# Patient Record
Sex: MALE | Race: WHITE | NOT HISPANIC OR LATINO | Employment: FULL TIME | URBAN - METROPOLITAN AREA
[De-identification: names, ages, dates, MRNs, and addresses within clinical notes are randomized per-mention and may not be internally consistent; named-entity substitution may affect disease eponyms.]

---

## 2019-01-17 ENCOUNTER — HOSPITAL ENCOUNTER (EMERGENCY)
Facility: CLINIC | Age: 44
Discharge: HOME | End: 2019-01-17
Attending: EMERGENCY MEDICINE | Admitting: EMERGENCY MEDICINE
Payer: COMMERCIAL

## 2019-01-17 ENCOUNTER — APPOINTMENT (OUTPATIENT)
Dept: CT IMAGING | Facility: CLINIC | Age: 44
End: 2019-01-17
Payer: COMMERCIAL

## 2019-01-17 ENCOUNTER — APPOINTMENT (OUTPATIENT)
Dept: ULTRASOUND IMAGING | Facility: CLINIC | Age: 44
End: 2019-01-17
Payer: COMMERCIAL

## 2019-01-17 VITALS
TEMPERATURE: 98.7 F | SYSTOLIC BLOOD PRESSURE: 135 MMHG | DIASTOLIC BLOOD PRESSURE: 81 MMHG | HEIGHT: 71 IN | BODY MASS INDEX: 25.48 KG/M2 | WEIGHT: 182 LBS | OXYGEN SATURATION: 96 % | RESPIRATION RATE: 22 BRPM | HEART RATE: 97 BPM

## 2019-01-17 DIAGNOSIS — R07.89 OTHER CHEST PAIN: ICD-10-CM

## 2019-01-17 DIAGNOSIS — K80.20 CALCULUS OF GALLBLADDER WITHOUT CHOLECYSTITIS WITHOUT OBSTRUCTION: ICD-10-CM

## 2019-01-17 LAB
ALBUMIN SERPL-MCNC: 4.8 G/DL (ref 3.4–5)
ALP SERPL-CCNC: 95 U/L (ref 40–150)
ALT SERPL W P-5'-P-CCNC: 32 U/L (ref 0–70)
ANION GAP SERPL CALCULATED.3IONS-SCNC: 9 MMOL/L (ref 3–14)
AST SERPL W P-5'-P-CCNC: 28 U/L (ref 0–45)
BASOPHILS # BLD AUTO: 0.1 10E9/L (ref 0–0.2)
BASOPHILS NFR BLD AUTO: 0.5 %
BILIRUB SERPL-MCNC: 0.6 MG/DL (ref 0.2–1.3)
BUN SERPL-MCNC: 20 MG/DL (ref 7–30)
CALCIUM SERPL-MCNC: 9.5 MG/DL (ref 8.5–10.1)
CHLORIDE SERPL-SCNC: 104 MMOL/L (ref 94–109)
CO2 SERPL-SCNC: 25 MMOL/L (ref 20–32)
CREAT SERPL-MCNC: 0.88 MG/DL (ref 0.66–1.25)
D DIMER PPP FEU-MCNC: 0.7 UG/ML FEU (ref 0–0.5)
DIFFERENTIAL METHOD BLD: NORMAL
EOSINOPHIL # BLD AUTO: 0.2 10E9/L (ref 0–0.7)
EOSINOPHIL NFR BLD AUTO: 1.5 %
ERYTHROCYTE [DISTWIDTH] IN BLOOD BY AUTOMATED COUNT: 12.1 % (ref 10–15)
GFR SERPL CREATININE-BSD FRML MDRD: >90 ML/MIN/{1.73_M2}
GLUCOSE SERPL-MCNC: 113 MG/DL (ref 70–99)
HCT VFR BLD AUTO: 51 % (ref 40–53)
HGB BLD-MCNC: 17.3 G/DL (ref 13.3–17.7)
IMM GRANULOCYTES # BLD: 0 10E9/L (ref 0–0.4)
IMM GRANULOCYTES NFR BLD: 0.3 %
INR PPP: 0.94 (ref 0.86–1.14)
INTERPRETATION ECG - MUSE: NORMAL
LIPASE SERPL-CCNC: 167 U/L (ref 73–393)
LYMPHOCYTES # BLD AUTO: 3.1 10E9/L (ref 0.8–5.3)
LYMPHOCYTES NFR BLD AUTO: 29.8 %
MCH RBC QN AUTO: 29.8 PG (ref 26.5–33)
MCHC RBC AUTO-ENTMCNC: 33.9 G/DL (ref 31.5–36.5)
MCV RBC AUTO: 88 FL (ref 78–100)
MONOCYTES # BLD AUTO: 0.8 10E9/L (ref 0–1.3)
MONOCYTES NFR BLD AUTO: 7.8 %
NEUTROPHILS # BLD AUTO: 6.3 10E9/L (ref 1.6–8.3)
NEUTROPHILS NFR BLD AUTO: 60.1 %
NRBC # BLD AUTO: 0 10*3/UL
NRBC BLD AUTO-RTO: 0 /100
PLATELET # BLD AUTO: 284 10E9/L (ref 150–450)
POTASSIUM SERPL-SCNC: 3.2 MMOL/L (ref 3.4–5.3)
PROT SERPL-MCNC: 8.5 G/DL (ref 6.8–8.8)
RBC # BLD AUTO: 5.8 10E12/L (ref 4.4–5.9)
SODIUM SERPL-SCNC: 138 MMOL/L (ref 133–144)
TROPONIN I SERPL-MCNC: <0.015 UG/L (ref 0–0.04)
WBC # BLD AUTO: 10.4 10E9/L (ref 4–11)

## 2019-01-17 PROCEDURE — 25000128 H RX IP 250 OP 636: Performed by: EMERGENCY MEDICINE

## 2019-01-17 PROCEDURE — 84484 ASSAY OF TROPONIN QUANT: CPT | Performed by: EMERGENCY MEDICINE

## 2019-01-17 PROCEDURE — 96374 THER/PROPH/DIAG INJ IV PUSH: CPT | Mod: 59

## 2019-01-17 PROCEDURE — 93005 ELECTROCARDIOGRAM TRACING: CPT

## 2019-01-17 PROCEDURE — 25000132 ZZH RX MED GY IP 250 OP 250 PS 637: Performed by: EMERGENCY MEDICINE

## 2019-01-17 PROCEDURE — 85379 FIBRIN DEGRADATION QUANT: CPT | Performed by: EMERGENCY MEDICINE

## 2019-01-17 PROCEDURE — 96375 TX/PRO/DX INJ NEW DRUG ADDON: CPT | Mod: 59

## 2019-01-17 PROCEDURE — 96361 HYDRATE IV INFUSION ADD-ON: CPT

## 2019-01-17 PROCEDURE — 85610 PROTHROMBIN TIME: CPT | Performed by: EMERGENCY MEDICINE

## 2019-01-17 PROCEDURE — 99285 EMERGENCY DEPT VISIT HI MDM: CPT | Mod: 25

## 2019-01-17 PROCEDURE — 85025 COMPLETE CBC W/AUTO DIFF WBC: CPT | Performed by: EMERGENCY MEDICINE

## 2019-01-17 PROCEDURE — 76705 ECHO EXAM OF ABDOMEN: CPT

## 2019-01-17 PROCEDURE — 80053 COMPREHEN METABOLIC PANEL: CPT | Performed by: EMERGENCY MEDICINE

## 2019-01-17 PROCEDURE — 71260 CT THORAX DX C+: CPT

## 2019-01-17 PROCEDURE — 83690 ASSAY OF LIPASE: CPT | Performed by: EMERGENCY MEDICINE

## 2019-01-17 RX ORDER — ONDANSETRON 2 MG/ML
4 INJECTION INTRAMUSCULAR; INTRAVENOUS
Status: COMPLETED | OUTPATIENT
Start: 2019-01-17 | End: 2019-01-17

## 2019-01-17 RX ORDER — HYDROMORPHONE HYDROCHLORIDE 1 MG/ML
0.5 INJECTION, SOLUTION INTRAMUSCULAR; INTRAVENOUS; SUBCUTANEOUS
Status: DISCONTINUED | OUTPATIENT
Start: 2019-01-17 | End: 2019-01-17 | Stop reason: HOSPADM

## 2019-01-17 RX ORDER — HYDROCODONE BITARTRATE AND ACETAMINOPHEN 5; 325 MG/1; MG/1
1 TABLET ORAL EVERY 6 HOURS PRN
Qty: 10 TABLET | Refills: 0 | Status: SHIPPED | OUTPATIENT
Start: 2019-01-17 | End: 2019-02-16

## 2019-01-17 RX ORDER — SODIUM CHLORIDE 9 MG/ML
1000 INJECTION, SOLUTION INTRAVENOUS CONTINUOUS
Status: DISCONTINUED | OUTPATIENT
Start: 2019-01-17 | End: 2019-01-17 | Stop reason: HOSPADM

## 2019-01-17 RX ORDER — KETOROLAC TROMETHAMINE 30 MG/ML
30 INJECTION, SOLUTION INTRAMUSCULAR; INTRAVENOUS ONCE
Status: COMPLETED | OUTPATIENT
Start: 2019-01-17 | End: 2019-01-17

## 2019-01-17 RX ORDER — IOPAMIDOL 755 MG/ML
500 INJECTION, SOLUTION INTRAVASCULAR ONCE
Status: COMPLETED | OUTPATIENT
Start: 2019-01-17 | End: 2019-01-17

## 2019-01-17 RX ORDER — ASPIRIN 81 MG/1
324 TABLET, CHEWABLE ORAL ONCE
Status: COMPLETED | OUTPATIENT
Start: 2019-01-17 | End: 2019-01-17

## 2019-01-17 RX ADMIN — Medication 0.5 MG: at 00:31

## 2019-01-17 RX ADMIN — IOPAMIDOL 69 ML: 755 INJECTION, SOLUTION INTRAVENOUS at 01:13

## 2019-01-17 RX ADMIN — SODIUM CHLORIDE 1000 ML: 9 INJECTION, SOLUTION INTRAVENOUS at 01:08

## 2019-01-17 RX ADMIN — ASPIRIN 81 MG 324 MG: 81 TABLET ORAL at 00:31

## 2019-01-17 RX ADMIN — ONDANSETRON 4 MG: 2 INJECTION INTRAMUSCULAR; INTRAVENOUS at 00:31

## 2019-01-17 RX ADMIN — KETOROLAC TROMETHAMINE 30 MG: 30 INJECTION, SOLUTION INTRAMUSCULAR at 01:08

## 2019-01-17 RX ADMIN — SODIUM CHLORIDE 83 ML: 9 INJECTION, SOLUTION INTRAVENOUS at 01:22

## 2019-01-17 ASSESSMENT — ENCOUNTER SYMPTOMS
VOMITING: 0
NAUSEA: 0
SHORTNESS OF BREATH: 1
DIZZINESS: 1
COUGH: 0
LIGHT-HEADEDNESS: 1

## 2019-01-17 ASSESSMENT — MIFFLIN-ST. JEOR: SCORE: 1752.68

## 2019-01-17 NOTE — ED PROVIDER NOTES
"  History     Chief Complaint:  Chest Pain    HPI   Rashad Traore is a 43 year old male, otherwise healthy, who presents to the emergency department for evaluation of low center chest pain. The patient reports he started experiencing sudden onset sharp shooting chest pain that radiates to his back when he lying down to go to bed about one hour prior to evaluation. He reports experiencing shortness of breath, lightheadedness and dizziness with the pain. He denies any nausea, vomiting, cough, or cold symptoms. He reports the pain does not necessarily worsen with deep breaths. Patient reports he last ate at about 1800 this evening and did have one drink, which is not unusual. He reports a recent flight home from [place] on 1/1/19. He denies any history of diabetes, GERD, drug use, smoking or recent trauma.    Allergies:  No known drug allergies     Medications:    The patient is not currently taking any prescribed medications.    Past Medical History:    The patient does not have any past pertinent medical history.    Past Surgical History:    History reviewed. No pertinent surgical history.    Family History:    History reviewed. No pertinent family history.     Social History:  Smoking status: Never  Alcohol use: Yes  The patient presents to the emergency department by himself  Marital Status:  Single [1]     Review of Systems   Respiratory: Positive for shortness of breath. Negative for cough.    Cardiovascular: Positive for chest pain.   Gastrointestinal: Negative for nausea and vomiting.   Neurological: Positive for dizziness and light-headedness.   All other systems reviewed and are negative.        Physical Exam   First Vitals:  BP: (!) 142/98  Pulse: 58  Temp: 98.7  F (37.1  C)  Resp: 22  Height: 180.3 cm (5' 11\")  Weight: 82.6 kg (182 lb)  SpO2: 98 %    Physical Exam  General: The patient is alert. Patient is tachypneic.    HENT: Mucous membranes moist.    Cardiovascular: Regular rate and rhythm. Good pulses " in all four extremities. Normal capillary refill and skin turgor.     Respiratory: Lungs are clear. No nasal flaring. No retractions. No wheezing, no crackles.    Gastrointestinal: Abdomen soft. No guarding, no rebound. No palpable hernias. No abdominal tenderness.    Musculoskeletal: No gross deformity.  Low chest wall tenderness. No crepitus.    Skin: No rashes or petechiae.     Neurologic: The patient is alert and oriented x3. GCS 15. No testable cranial nerve deficit. Follows commands with clear and appropriate speech. Gives appropriate answers. Good strength in all extremities. No gross neurologic deficit. Gross sensation intact. Pupils are round and reactive. No meningismus.     Lymphatic: No cervical adenopathy. No lower extremity swelling.    Psychiatric: The patient is non-tearful.    Emergency Department Course   ECG (0:19:39):  Rate 65 bpm. KY interval 134. QRS duration 82. QT/QTc 376/391. P-R-T axes 28 24 49. Sinus rhythm with marked sinus arrhythmia. Otherwise normal ECG. Interpreted at 0020 by Brian Toribio MD.    Imaging:  Radiographic findings were communicated with the patient who voiced understanding of the findings.    Chest CT, IV contrast only - PE protocol  IMPRESSION: There is no pulmonary embolus, aortic aneurysm or  dissection. No acute abnormality.   As read by Radiology.    Abdomen US, limited (RUQ only)  IMPRESSION: Cholelithiasis. There is no biliary dilatation or evidence  of cholecystitis.  As read by Radiology.    Laboratory:  CBC: AWNL (WBC 10.4, HGB 17.3, )  CMP: Potassium 3.2 (L), Glucose 113 (H) o/w WNL (Creatinine 0.88)    Troponin I (0023): <0.015  Lipase: 167  INR: 0.94  D dimer: 0.7 (H)    Interventions:  0031 Dilaudid 0.5 mg IV  0031 Aspirin 324 mg PO  0031 Zofran 4 mg IV  0108 NS 1L IV Bolus  0108 Toradol 30 mg IV    Emergency Department Course:  Past medical records, nursing notes, and vitals reviewed.  0019: I performed an exam of the patient and obtained  history, as documented above.     IV inserted and blood drawn.    The patient was sent for a chest CT while in the emergency department, findings above.     0100: I rechecked the patient. Patient reports pain is slightly duller, but still there. We will scan him.    0145: I rechecked the patient. He reports his pain is doing better.    0244: I rechecked the patient. Findings and plan explained to the Patient. Patient discharged home with instructions regarding supportive care, medications, and reasons to return. The importance of close follow-up was reviewed.   Impression & Plan    Medical Decision Making:  The patient had recently flown to [facility] and reports sudden onset of sharp pleuritic pain, worsening when he takes a deep breath. He is shortness of breath and he has no abdominal tenderness. At this point, I was highly suspicious that this could potentially be a PE, however due to his lack of classic symptoms, I did order a d dimer instead of a chest CT. The d dimer was positive and so I did perform a chest CT, which was negative besides some atelectasis. When this came back negative, I did feel that other causes were possible, such as intraabdominal causes and with the patient being so uncomfortable, yet moving, I did not feel that a peritoneal finding was the cause. I did consider stones, such as gallstones or kidney stones. The patient could not give us a urine sample but the ultrasound shows signs of cholelithiasis but no cholecystitis. No dilated duct, LFT's were normal, the patient was pain free. I did refer him to surgery as an outpatient and he was discharged home with a limited supply of pain medication and to return with any problems and is aware of potential complications.     Diagnosis:    ICD-10-CM   1. Other chest pain R07.89   2. Calculus of gallbladder without cholecystitis without obstruction K80.20     Disposition:  Discharged to home with instructions for follow up.  Discharge  Medications:  Started    HYDROcodone-acetaminophen 5-325 MG tablet  Commonly known as:  NORCO  1 tablet, Oral, EVERY 6 HOURS PRN       Alek Alcantar  1/17/2019   [facility] EMERGENCY DEPARTMENT  Scribe Disclosure:  I, Alek Alcantar, am serving as a scribe at 12:19 AM on 1/17/2019 to document services personally performed by Brian Toribio MD based on my observations and the provider's statements to me.      Brian Toribio MD  01/17/19 0458

## 2019-01-17 NOTE — ED AVS SNAPSHOT
[facility] Emergency Department  [address]  [facility] [address]  Phone:  [phone]  Fax:  [phone]                                    Rashad Traore   MRN: [id number]    Department:  [facility] Emergency Department   Date of Visit:  1/17/2019           After Visit Summary Signature Page    I have received my discharge instructions, and my questions have been answered. I have discussed any challenges I see with this plan with the nurse or doctor.    ..........................................................................................................................................  Patient/Patient Representative Signature      ..........................................................................................................................................  Patient Representative Print Name and Relationship to Patient    ..................................................               ................................................  Date                                   Time    ..........................................................................................................................................  Reviewed by Signature/Title    ...................................................              ..............................................  Date                                               Time          22EPIC Rev 08/18

## 2019-01-17 NOTE — ED TRIAGE NOTES
3039 sudden SOB/chest pain moving to back. Pt cant get full breath. Recent flight to [place].    Pt A&O x 3, CMS x 3, ABCD's adequate in triage

## 2020-08-15 ENCOUNTER — HOSPITAL ENCOUNTER (EMERGENCY)
Facility: CLINIC | Age: 45
Discharge: HOME | End: 2020-08-16
Attending: EMERGENCY MEDICINE | Admitting: EMERGENCY MEDICINE
Payer: COMMERCIAL

## 2020-08-15 DIAGNOSIS — K80.20 SYMPTOMATIC CHOLELITHIASIS: ICD-10-CM

## 2020-08-15 LAB
ALBUMIN SERPL-MCNC: 4.6 G/DL (ref 3.4–5)
ALP SERPL-CCNC: 95 U/L (ref 40–150)
ALT SERPL W P-5'-P-CCNC: 46 U/L (ref 0–70)
ANION GAP SERPL CALCULATED.3IONS-SCNC: 7 MMOL/L (ref 3–14)
AST SERPL W P-5'-P-CCNC: 26 U/L (ref 0–45)
BASOPHILS # BLD AUTO: 0.1 10E9/L (ref 0–0.2)
BASOPHILS NFR BLD AUTO: 0.4 %
BILIRUB SERPL-MCNC: 0.5 MG/DL (ref 0.2–1.3)
BUN SERPL-MCNC: 19 MG/DL (ref 7–30)
CALCIUM SERPL-MCNC: 9.1 MG/DL (ref 8.5–10.1)
CHLORIDE SERPL-SCNC: 105 MMOL/L (ref 94–109)
CO2 SERPL-SCNC: 26 MMOL/L (ref 20–32)
CREAT SERPL-MCNC: 0.96 MG/DL (ref 0.66–1.25)
DIFFERENTIAL METHOD BLD: ABNORMAL
EOSINOPHIL # BLD AUTO: 0.1 10E9/L (ref 0–0.7)
EOSINOPHIL NFR BLD AUTO: 0.8 %
ERYTHROCYTE [DISTWIDTH] IN BLOOD BY AUTOMATED COUNT: 12 % (ref 10–15)
GFR SERPL CREATININE-BSD FRML MDRD: >90 ML/MIN/{1.73_M2}
GLUCOSE SERPL-MCNC: 110 MG/DL (ref 70–99)
HCT VFR BLD AUTO: 46.1 % (ref 40–53)
HGB BLD-MCNC: 15.2 G/DL (ref 13.3–17.7)
IMM GRANULOCYTES # BLD: 0.1 10E9/L (ref 0–0.4)
IMM GRANULOCYTES NFR BLD: 0.5 %
LIPASE SERPL-CCNC: 133 U/L (ref 73–393)
LYMPHOCYTES # BLD AUTO: 1.9 10E9/L (ref 0.8–5.3)
LYMPHOCYTES NFR BLD AUTO: 11.6 %
MCH RBC QN AUTO: 29.7 PG (ref 26.5–33)
MCHC RBC AUTO-ENTMCNC: 33 G/DL (ref 31.5–36.5)
MCV RBC AUTO: 90 FL (ref 78–100)
MONOCYTES # BLD AUTO: 0.6 10E9/L (ref 0–1.3)
MONOCYTES NFR BLD AUTO: 3.7 %
NEUTROPHILS # BLD AUTO: 13.6 10E9/L (ref 1.6–8.3)
NEUTROPHILS NFR BLD AUTO: 83 %
NRBC # BLD AUTO: 0 10*3/UL
NRBC BLD AUTO-RTO: 0 /100
PLATELET # BLD AUTO: 273 10E9/L (ref 150–450)
POTASSIUM SERPL-SCNC: 3.4 MMOL/L (ref 3.4–5.3)
PROT SERPL-MCNC: 8 G/DL (ref 6.8–8.8)
RBC # BLD AUTO: 5.11 10E12/L (ref 4.4–5.9)
SODIUM SERPL-SCNC: 138 MMOL/L (ref 133–144)
TROPONIN I SERPL-MCNC: <0.015 UG/L (ref 0–0.04)
WBC # BLD AUTO: 16.4 10E9/L (ref 4–11)

## 2020-08-15 PROCEDURE — 99285 EMERGENCY DEPT VISIT HI MDM: CPT | Mod: 25

## 2020-08-15 PROCEDURE — 25000128 H RX IP 250 OP 636: Performed by: EMERGENCY MEDICINE

## 2020-08-15 PROCEDURE — 85025 COMPLETE CBC W/AUTO DIFF WBC: CPT | Performed by: EMERGENCY MEDICINE

## 2020-08-15 PROCEDURE — 96374 THER/PROPH/DIAG INJ IV PUSH: CPT

## 2020-08-15 PROCEDURE — 80053 COMPREHEN METABOLIC PANEL: CPT | Performed by: EMERGENCY MEDICINE

## 2020-08-15 PROCEDURE — 83690 ASSAY OF LIPASE: CPT | Performed by: EMERGENCY MEDICINE

## 2020-08-15 PROCEDURE — 93005 ELECTROCARDIOGRAM TRACING: CPT

## 2020-08-15 PROCEDURE — 84484 ASSAY OF TROPONIN QUANT: CPT | Performed by: EMERGENCY MEDICINE

## 2020-08-15 PROCEDURE — 96375 TX/PRO/DX INJ NEW DRUG ADDON: CPT

## 2020-08-15 RX ORDER — KETOROLAC TROMETHAMINE 15 MG/ML
15 INJECTION, SOLUTION INTRAMUSCULAR; INTRAVENOUS ONCE
Status: COMPLETED | OUTPATIENT
Start: 2020-08-15 | End: 2020-08-15

## 2020-08-15 RX ORDER — HYDROMORPHONE HYDROCHLORIDE 1 MG/ML
0.5 INJECTION, SOLUTION INTRAMUSCULAR; INTRAVENOUS; SUBCUTANEOUS
Status: DISCONTINUED | OUTPATIENT
Start: 2020-08-15 | End: 2020-08-16 | Stop reason: HOSPADM

## 2020-08-15 RX ORDER — ONDANSETRON 2 MG/ML
4 INJECTION INTRAMUSCULAR; INTRAVENOUS ONCE
Status: COMPLETED | OUTPATIENT
Start: 2020-08-15 | End: 2020-08-15

## 2020-08-15 RX ADMIN — ONDANSETRON 4 MG: 2 INJECTION INTRAMUSCULAR; INTRAVENOUS at 23:26

## 2020-08-15 RX ADMIN — HYDROMORPHONE HYDROCHLORIDE 0.5 MG: 1 INJECTION, SOLUTION INTRAMUSCULAR; INTRAVENOUS; SUBCUTANEOUS at 23:43

## 2020-08-15 RX ADMIN — KETOROLAC TROMETHAMINE 15 MG: 15 INJECTION, SOLUTION INTRAMUSCULAR; INTRAVENOUS at 23:27

## 2020-08-15 ASSESSMENT — ENCOUNTER SYMPTOMS
ABDOMINAL PAIN: 1
COUGH: 0
FEVER: 0
RHINORRHEA: 1

## 2020-08-15 NOTE — ED AVS SNAPSHOT
[facility] Emergency Department  [address]  [facility] [address]  Phone:  [phone]  Fax:  [phone]                                    Rashad Traore   MRN: [id number]    Department:  [facility] Emergency Department   Date of Visit:  8/15/2020           After Visit Summary Signature Page    I have received my discharge instructions, and my questions have been answered. I have discussed any challenges I see with this plan with the nurse or doctor.    ..........................................................................................................................................  Patient/Patient Representative Signature      ..........................................................................................................................................  Patient Representative Print Name and Relationship to Patient    ..................................................               ................................................  Date                                   Time    ..........................................................................................................................................  Reviewed by Signature/Title    ...................................................              ..............................................  Date                                               Time          22EPIC Rev 08/18

## 2020-08-16 ENCOUNTER — APPOINTMENT (OUTPATIENT)
Dept: ULTRASOUND IMAGING | Facility: CLINIC | Age: 45
End: 2020-08-16
Attending: EMERGENCY MEDICINE
Payer: COMMERCIAL

## 2020-08-16 VITALS
DIASTOLIC BLOOD PRESSURE: 91 MMHG | TEMPERATURE: 98 F | HEART RATE: 59 BPM | RESPIRATION RATE: 20 BRPM | SYSTOLIC BLOOD PRESSURE: 139 MMHG | OXYGEN SATURATION: 100 %

## 2020-08-16 PROCEDURE — 76705 ECHO EXAM OF ABDOMEN: CPT

## 2020-08-16 RX ORDER — HYDROCODONE BITARTRATE AND ACETAMINOPHEN 5; 325 MG/1; MG/1
1 TABLET ORAL EVERY 6 HOURS PRN
Qty: 12 TABLET | Refills: 0 | Status: SHIPPED | OUTPATIENT
Start: 2020-08-16 | End: 2020-08-19

## 2020-08-16 NOTE — DISCHARGE INSTRUCTIONS
Diagnosis: Biliary colic (symptomatic cholelithiasis)  Plan: Follow-up with general surgery to discuss possibly removing your gall bladder to prevent further pain like this.  Return Precautions:   Fever > 100.4, skin/eyes turning yellow, uncontrollable pain or nausea/vomiting, or for any other concerns.     Please read the remainder of your discharge instructions for more information.

## 2020-08-16 NOTE — ED PROVIDER NOTES
History     Chief Complaint:  Abdominal Pain    HPI   Rashad Traore is a 45 year old male who presents with abdominal pain. The patient has severe, squeezing, abdominal pain that started 1.5 hours ago. He says he feels like he has to burp or throw up. The patient states his symptoms are very similar to his symptoms when he had gallstones 1.5 years ago. He has not taken any pain medications. He notes he has a runny nose which he thinks is due to allergies. He denies fever, cough, or COVID-19 symptoms.    Allergies:  No Known Drug Allergies      Medications:    The patient is not currently taking any prescribed medications.     Past Medical History:    History reviewed. No pertinent past medical history.     Past Surgical History:    History reviewed. No pertinent surgical history.     Family History:    History reviewed. No pertinent family history.      Social History:  Smoking status: No  Alcohol use: Yes  Drug use: No  Patient presents alone.  Marital Status:  Single      Review of Systems   Constitutional: Negative for fever.   HENT: Positive for rhinorrhea.    Respiratory: Negative for cough.    Gastrointestinal: Positive for abdominal pain.   All other systems reviewed and are negative.      Physical Exam     Patient Vitals for the past 24 hrs:   BP Temp Temp src Pulse Heart Rate Resp SpO2   08/16/20 0030 (!) 154/94 -- -- 54 59 -- 99 %   08/16/20 0020 (!) 153/93 -- -- 67 (!) 46 -- --   08/16/20 0000 (!) 159/99 -- -- (!) 40 (!) 41 -- 99 %   08/15/20 2350 (!) 161/98 -- -- (!) 42 52 -- 100 %   08/15/20 2340 (!) 162/100 -- -- -- 57 -- --   08/15/20 2311 (!) 182/107 98  F (36.7  C) Oral 75 75 20 99 %      Physical Exam    Nursing note and vitals reviewed.  General: Patient is alert and appears uncomfortable.   Head:  The scalp, face, and head appear normal  Eyes:  Conjunctivae are normal  ENT:    The nose is normal    Pinnae are normal  Neck:  Trachea midline  CV:  Normal rate  Resp:  No respiratory  distress.  GI:  Epigastric and RUQ TTP  Musc:  Normal muscular tone  Skin:  No rash or lesions noted  Neuro: Speech is normal and fluent. Face is symmetric. Moving all extremities well.   Psych:  Awake. Alert.  Normal affect.  Appropriate interactions.          Emergency Department Course   ECG:  @ 2330  Indication: Abdominal pain   Vent. Rate 60 bpm. AR interval 152 ms. QRS duration 78 ms. QT/QTc 372/372 ms. P-R-T axis 29 48 52.   Sinus rhythm with marked sinus arrhythmia. Otherwise normal ECG.    Read @ 2320 by Dr. Piña.     Imaging:  US Abdomen limited (RUQ only)  IMPRESSION:  1.  Cholelithiasis as on prior.  Reading per radiology.      Radiographic findings were communicated with the patient who voiced understanding of the findings.    Laboratory:  CBC: WBC 16.4 (H), HGB 15.2,     CMP: Glucose 110 (H), o/w WNL (Creatinine: 0.96)     2323 Troponin I: <0.015    Lipase: 133      Interventions:  2326 Zofran 4 mg IV  2327 Toradol 15 mg IV  2343 Dilaudid 0.5 mg IV    Emergency Department Course:  2322 Nursing notes and vitals reviewed. I performed an exam of the patient as documented above.     EKG was done, interpretation as above.    IV inserted. Medicine administered as documented above. Blood drawn. This was sent to the lab for further testing, results above.    The patient was sent for an ultrasound while in the emergency department, findings above.     0046 I rechecked the patient and discussed the results of his workup thus far.     Findings and plan explained to the Patient. Patient discharged home with instructions regarding supportive care, medications, and reasons to return. The importance of close follow-up was reviewed.     I personally reviewed the laboratory and imaging results with the Patient and answered all related questions prior to discharge.     Impression & Plan    Medical Decision Making:  Pt presents with epigastric/RUQ abd pain. Patient has symptoms consistent with gallstones and  biliary colic.  Ultrasound has confirmed the presence of gallstones.  There is no clinical, laboratory, or ultrasound evidence of choledocholithiasis, gallstone pancreatitis, or ascending cholangitis.  I doubt perforated ulcer, diverticulitis, colitis.  Certainly also may have component of GERD or gastritis.  The patient will be prescribed analgesics. The patient was educated to avoid fatty foods.  The patient was told to return to ED for increasing pain, vomiting, chills, sweats, or fever.  Follow up with general surgery is indicated for outpatient consultation, this may be arranged as soon as able.  See primary care doctor this week for recheck.        Diagnosis:    ICD-10-CM    1. Symptomatic cholelithiasis  K80.20        Disposition:  discharged to home    Discharge Medications:  Discharge Medication List as of 8/16/2020  1:22 AM      START taking these medications    Details   HYDROcodone-acetaminophen (NORCO) 5-325 MG tablet Take 1 tablet by mouth every 6 hours as needed for severe pain, Disp-12 tablet,R-0, Local Print             Josefa Garcia  8/15/2020   [facility] EMERGENCY DEPARTMENT    Scribe Disclosure:  I, Josefa Garcia, am serving as a scribe at 11:22 PM on 8/15/2020 to document services personally performed by Marvin Piña MD based on my observations and the provider's statements to me.         Marvin Piña MD  08/16/20 9136

## 2020-08-16 NOTE — ED TRIAGE NOTES
Pt arrives with LUQ and midline abd pain that started severely about an hour ago, says it feels like gallbladder which he had problems with over a year ago, nausea no vomiting, ABCs intact, A/O x4, appears very uncomfortable, denies cardiac history.

## 2020-08-17 LAB — INTERPRETATION ECG - MUSE: NORMAL

## 2020-11-30 ENCOUNTER — VIRTUAL VISIT (OUTPATIENT)
Dept: SURGERY | Facility: CLINIC | Age: 45
End: 2020-11-30
Payer: COMMERCIAL

## 2020-11-30 ENCOUNTER — PREP FOR PROCEDURE (OUTPATIENT)
Dept: SURGERY | Facility: CLINIC | Age: 45
End: 2020-11-30

## 2020-11-30 ENCOUNTER — TELEPHONE (OUTPATIENT)
Dept: SURGERY | Facility: CLINIC | Age: 45
End: 2020-11-30

## 2020-11-30 VITALS — BODY MASS INDEX: 26.48 KG/M2 | HEIGHT: 70 IN | WEIGHT: 185 LBS

## 2020-11-30 DIAGNOSIS — K80.50 BILIARY COLIC: Primary | ICD-10-CM

## 2020-11-30 PROCEDURE — 99202 OFFICE O/P NEW SF 15 MIN: CPT | Mod: GT | Performed by: SURGERY

## 2020-11-30 ASSESSMENT — MIFFLIN-ST. JEOR: SCORE: 1740.4

## 2020-11-30 NOTE — PROGRESS NOTES
"Rashad Traore is a 45 year old male who is being evaluated via a billable video visit.      The patient has been notified of following:     \"This video visit will be conducted via a call between you and your physician/provider. We have found that certain health care needs can be provided without the need for an in-person physical exam.  This service lets us provide the care you need with a video conversation.  If a prescription is necessary we can send it directly to your pharmacy.  If lab work is needed we can place an order for that and you can then stop by our lab to have the test done at a later time.    Video visits are billed at different rates depending on your insurance coverage.  Please reach out to your insurance provider with any questions.    If during the course of the call the physician/provider feels a video visit is not appropriate, you will not be charged for this service.\"    Patient has given verbal consent for Video visit? Yes    If you are dropped from the video visit, the video invite should be resent to: Text to cell phone: [phone]  Will anyone else be joining your video visit? No        Video-Visit Details    Type of service:  Video Visit    Video Start Time: 9:52  Video End Time: 10:09    Originating Location (pt. Location): Home    Distant Location (provider location):  [facility] SURGERY [facility]     Platform used for Video Visit: Allie    44yo male with known gallstones dating back to January 2018; he had two attacks that year but none again until August. He describes having epigastric pain which radiates to the back and RUQ and last for several hours in the evening. No particular diet triggers apart from having larger meals. No jaundice or icterus. No prior abdominal surgeries. He had a recent ED visit in August which again showed gallstones.  Discussed physiology of biliary colic. Given the longstanding history and recurrence of the symptoms, he would like to " schedule surgery. Risks, alternatives and anticipated recovery discussed. We will reach out to him to schedule today.    Jacob Edwards MD

## 2020-11-30 NOTE — TELEPHONE ENCOUNTER
Type of surgery: LAPAROSCOPIC CHOLECYSTECTOMY   Location of surgery: [facility]  Date and time of surgery: 12/18/2020 @ 8:45 AM   Surgeon: ROBIN BARFIELD MD    Pre-Op Appt Date: PATIENT TO SCHEDULE    Post-Op Appt Date: PATIENT TO SCHEDULE     Packet sent out: Yes  Pre-cert/Authorization completed:  Not Applicable  Date: 11/30/2020       LAPAROSCOPIC CHOLECYSTECTOMY    GENERAL PT INST TO HAVE H&P WITH PCP 70 MIN REQ PA ASSIST RMO NMS

## 2020-12-01 DIAGNOSIS — Z11.59 ENCOUNTER FOR SCREENING FOR OTHER VIRAL DISEASES: Primary | ICD-10-CM

## 2020-12-11 RX ORDER — TAMSULOSIN HYDROCHLORIDE 0.4 MG/1
0.4 CAPSULE ORAL
Status: CANCELLED | OUTPATIENT
Start: 2020-12-11

## 2020-12-14 ENCOUNTER — OFFICE VISIT (OUTPATIENT)
Dept: FAMILY MEDICINE | Facility: CLINIC | Age: 45
End: 2020-12-14
Payer: COMMERCIAL

## 2020-12-14 VITALS
HEART RATE: 74 BPM | DIASTOLIC BLOOD PRESSURE: 68 MMHG | BODY MASS INDEX: 27.48 KG/M2 | SYSTOLIC BLOOD PRESSURE: 120 MMHG | RESPIRATION RATE: 16 BRPM | HEIGHT: 71 IN | OXYGEN SATURATION: 99 % | TEMPERATURE: 98.1 F | WEIGHT: 196.3 LBS

## 2020-12-14 DIAGNOSIS — E87.6 LOW BLOOD POTASSIUM: ICD-10-CM

## 2020-12-14 DIAGNOSIS — R73.09 ELEVATED GLUCOSE: ICD-10-CM

## 2020-12-14 DIAGNOSIS — K80.10 CALCULUS OF GALLBLADDER WITH CHRONIC CHOLECYSTITIS WITHOUT OBSTRUCTION: ICD-10-CM

## 2020-12-14 DIAGNOSIS — Z01.818 PREOP GENERAL PHYSICAL EXAM: Primary | ICD-10-CM

## 2020-12-14 LAB
HBA1C MFR BLD: 5.1 % (ref 0–5.6)
HGB BLD-MCNC: 14.8 G/DL (ref 13.3–17.7)

## 2020-12-14 PROCEDURE — 85018 HEMOGLOBIN: CPT | Performed by: INTERNAL MEDICINE

## 2020-12-14 PROCEDURE — 99204 OFFICE O/P NEW MOD 45 MIN: CPT | Performed by: INTERNAL MEDICINE

## 2020-12-14 PROCEDURE — 36415 COLL VENOUS BLD VENIPUNCTURE: CPT | Performed by: INTERNAL MEDICINE

## 2020-12-14 PROCEDURE — 83036 HEMOGLOBIN GLYCOSYLATED A1C: CPT | Performed by: INTERNAL MEDICINE

## 2020-12-14 PROCEDURE — 80048 BASIC METABOLIC PNL TOTAL CA: CPT | Performed by: INTERNAL MEDICINE

## 2020-12-14 ASSESSMENT — MIFFLIN-ST. JEOR
SCORE: 1799.6
SCORE: 1749.48

## 2020-12-14 NOTE — PATIENT INSTRUCTIONS

## 2020-12-14 NOTE — H&P (VIEW-ONLY)
[facility]  [address] [address]  Phone: [phone]  Primary Provider: No Ref-Primary, Physician  Pre-op Performing Provider: ASTRID FABIAN    PREOPERATIVE EVALUATION:  Today's date: 12/14/2020    Rashad Traore is a 45 year old male who presents for a preoperative evaluation.    Surgical Information:  Surgery/Procedure: LAPAROSCOPIC CHOLECYSTECTOMY  Surgery Location: [facility]   Surgeon: Jacob Edwards  Surgery Date: 12/18/2020  Time of Surgery: 8:50 am  Where patient plans to recover: At home with family  Fax number for surgical facility: Note does not need to be faxed, will be available electronically in Epic.    Type of Anesthesia Anticipated: General    Subjective     HPI related to upcoming procedure: Rashad Traore is a 45 year old male who presents for preoperative evaluation prior to having laproscopic cholelithiasis  Stone near the  Bile duct, dilated to 6 mm   Chart reviewed and glucose slightly elevated and potassium has been low in the past.     Lab Results   Component Value Date    POTASSIUM 3.4 08/15/2020    POTASSIUM 3.2 01/17/2019        Lab Results   Component Value Date     08/15/2020     01/17/2019      No results found for: A1C     Ultrasound done 8/16/2020  EXAM: US ABDOMEN LIMITED  LOCATION: [facility]  DATE/TIME: 8/16/2020 12:06 AM     INDICATION: Epigastric abdominal pain  COMPARISON: 01/17/2019  TECHNIQUE: Limited abdominal ultrasound.     FINDINGS:     GALLBLADDER: Cholelithiasis with a stone near the gallbladder neck. Normal wall thickness. Negative sonographic Savage's.     BILE DUCTS: No biliary dilatation. The common duct measures 6 mm.     LIVER: Normal parenchyma with smooth contour. No focal mass.     RIGHT KIDNEY: No hydronephrosis.     PANCREAS: The visualized portions are normal.     No ascites.                                                                       IMPRESSION:  1.  Cholelithiasis as on prior.          Preop Questions 12/11/2020   1. Have you ever had a heart attack or stroke? No   2. Have you ever had surgery on your heart or blood vessels, such as a stent placement, a coronary artery bypass, or surgery on an artery in your head, neck, heart, or legs? No   3. Do you have chest pain with activity? No   4. Do you have a history of  heart failure? No   5. Do you currently have a cold, bronchitis or symptoms of other infection? No   6. Do you have a cough, shortness of breath, or wheezing? No   7. Do you or anyone in your family have previous history of blood clots? No   8. Do you or does anyone in your family have a serious bleeding problem such as prolonged bleeding following surgeries or cuts? No   9. Have you ever had problems with anemia or been told to take iron pills? No   10. Have you had any abnormal blood loss such as black, tarry or bloody stools? No   11. Have you ever had a blood transfusion? No   12. Are you willing to have a blood transfusion if it is medically needed before, during, or after your surgery? Yes   13. Have you or any of your relatives ever had problems with anesthesia? No   14. Do you have sleep apnea, excessive snoring or daytime drowsiness? No   15. Do you have any artifical heart valves or other implanted medical devices like a pacemaker, defibrillator, or continuous glucose monitor? No   16. Do you have artificial joints? No   17. Are you allergic to latex? No       Health Care Directive:  Patient does not have a Health Care Directive or Living Will: Discussed advance care planning with patient; however, patient declined at this time.    Preoperative Review of :   reviewed - no record of controlled substances prescribed.      Status of Chronic Conditions:  No Chronic Health Problems    Review of Systems  CONSTITUTIONAL: NEGATIVE for fever, chills, change in weight  INTEGUMENTARY/SKIN: NEGATIVE for worrisome rashes, moles  "or lesions  EYES: NEGATIVE for vision changes or irritation  ENT/MOUTH: NEGATIVE for ear, mouth and throat problems  RESP: NEGATIVE for significant cough or SOB  CV: NEGATIVE for chest pain, palpitations or peripheral edema  GI: intermittent bouts of biliary colic; gall stones noted on ER evaluation   : NEGATIVE for frequency, dysuria, or hematuria  MUSCULOSKELETAL: NEGATIVE for significant arthralgias or myalgia  NEURO: NEGATIVE for weakness, dizziness or paresthesias  ENDOCRINE: NEGATIVE for temperature intolerance, skin/hair changes  HEME: NEGATIVE for bleeding problems  PSYCHIATRIC: NEGATIVE for changes in mood or affect    Patient Active Problem List    Diagnosis Date Noted     Biliary colic 11/30/2020     Priority: Medium     Added automatically from request for surgery [id number]        History reviewed. No pertinent past medical history.  History reviewed. No pertinent surgical history.  No current outpatient medications on file.       No Known Allergies     Social History     Tobacco Use     Smoking status: Never Smoker     Smokeless tobacco: Never Used   Substance Use Topics     Alcohol use: Yes     Comment: 3 drinks per week     Family History   Problem Relation Age of Onset     Cancer Father         leukemia     Diabetes Daughter         type 1 diabetes      History   Drug Use No         Objective     /68   Pulse 74   Temp 98.1  F (36.7  C) (Oral)   Resp 16   Ht 1.791 m (5' 10.5\")   Wt 89 kg (196 lb 4.8 oz)   SpO2 99%   BMI 27.77 kg/m      Physical Exam    GENERAL APPEARANCE: healthy, alert and no distress     EYES: Eyes grossly normal to inspection     HENT: ear canals and TM's normal and nose and mouth without ulcers or lesions     NECK: no adenopathy, no asymmetry, masses, or scars and thyroid normal to palpation     RESP: lungs clear to auscultation - no rales, rhonchi or wheezes     CV: regular rates and rhythm, normal S1 S2, no S3 or S4 and no murmur, click or rub     ABDOMEN:  soft, " nontender, no HSM or masses and bowel sounds normal     MS: extremities normal- no gross deformities noted, no evidence of inflammation in joints, FROM in all extremities.     SKIN: no suspicious lesions or rashes     NEURO: Normal strength and tone, sensory exam grossly normal, mentation intact and speech normal     PSYCH: mentation appears normal. and affect normal/bright    Recent Labs   Lab Test 08/15/20  2323 01/17/19  0023   HGB 15.2 17.3    284   INR  --  0.94    138   POTASSIUM 3.4 3.2*   CR 0.96 0.88        Diagnostics:  Recent Results (from the past 168 hour(s))   Basic metabolic panel    Collection Time: 12/14/20  4:16 PM   Result Value Ref Range    Sodium 139 133 - 144 mmol/L    Potassium 4.0 3.4 - 5.3 mmol/L    Chloride 108 94 - 109 mmol/L    Carbon Dioxide 26 20 - 32 mmol/L    Anion Gap 5 3 - 14 mmol/L    Glucose 92 70 - 99 mg/dL    Urea Nitrogen 13 7 - 30 mg/dL    Creatinine 0.83 0.66 - 1.25 mg/dL    GFR Estimate >90 >60 mL/min/[1.73_m2]    GFR Estimate If Black >90 >60 mL/min/[1.73_m2]    Calcium 9.3 8.5 - 10.1 mg/dL   Hemoglobin A1c    Collection Time: 12/14/20  4:16 PM   Result Value Ref Range    Hemoglobin A1C 5.1 0 - 5.6 %   Hemoglobin    Collection Time: 12/14/20  4:16 PM   Result Value Ref Range    Hemoglobin 14.8 13.3 - 17.7 g/dL      No EKG this visit, completed in the last 90 days.    Revised Cardiac Risk Index (RCRI):  The patient has the following serious cardiovascular risks for perioperative complications:   - No serious cardiac risks = 0 points     RCRI Interpretation: 0 points: Class I (very low risk - 0.4% complication rate)         Assessment & Plan   The proposed surgical procedure is considered LOW risk.    (Z01.818) Preop general physical exam  (primary encounter diagnosis)  Comment: HEALTH CARE MAINTENANCE reviewed  Plan: proceed with surgery    (K80.10) Calculus of gallbladder with chronic cholecystitis without obstruction  Comment: no elevation of LFTs  Plan:  proceed with elective laparoscopic cholecystectomy; reviewed expectations following removal of GB including risk for diarrhea    (E87.6) Low blood potassium  Comment: in ER, Potassium was on the low side; recommend rupal  Plan: Basic metabolic panel          (R73.09) Elevated glucose  Comment: elevated glucose on labs done in ER; suspect it was not fasting; family hx of DM reviewed.   Plan: Hemoglobin A1c, Hemoglobin                Risks and Recommendations:  The patient has the following additional risks and recommendations for perioperative complications:   - No identified additional risk factors other than previously addressed    Medication Instructions:  Patient is on no chronic medications    RECOMMENDATION:  APPROVAL GIVEN to proceed with proposed procedure, without further diagnostic evaluation.    Signed Electronically by: MD Chika Davison MD  Internal Medicine  electronically signed      --Approval given to proceed with proposed procedure, without further diagnostic evaluation  --Pt advised to avoid NSAIDS (Motrin, Ibuprofen, Aleve or Naprosyn);  If needed, Tylenol or Acetaminophen are fine to use.  --meds reviewed; he is on no chronic, daily medications.   --Pain medications, time off from work and FMLA following surgery deferred to surgeon.    Covid test as ordered by surgery. Set for  12/15/20 at [facility].         Copy of this evaluation report is provided to requesting physician.    Preop [facility] Preop Guidelines    Revised Cardiac Risk Index

## 2020-12-14 NOTE — PROGRESS NOTES
[facility]  [address] [address]  Phone: [phone]  Primary Provider: No Ref-Primary, Physician  Pre-op Performing Provider: ASTRID FABIAN    PREOPERATIVE EVALUATION:  Today's date: 12/14/2020    Rashad Traore is a 45 year old male who presents for a preoperative evaluation.    Surgical Information:  Surgery/Procedure: LAPAROSCOPIC CHOLECYSTECTOMY  Surgery Location: [facility]   Surgeon: Jacob Edwards  Surgery Date: 12/18/2020  Time of Surgery: 8:50 am  Where patient plans to recover: At home with family  Fax number for surgical facility: Note does not need to be faxed, will be available electronically in Epic.    Type of Anesthesia Anticipated: General    Subjective     HPI related to upcoming procedure: Rashad Traore is a 45 year old male who presents for preoperative evaluation prior to having laproscopic cholelithiasis  Stone near the  Bile duct, dilated to 6 mm   Chart reviewed and glucose slightly elevated and potassium has been low in the past.     Lab Results   Component Value Date    POTASSIUM 3.4 08/15/2020    POTASSIUM 3.2 01/17/2019        Lab Results   Component Value Date     08/15/2020     01/17/2019      No results found for: A1C     Ultrasound done 8/16/2020  EXAM: US ABDOMEN LIMITED  LOCATION: [facility]  DATE/TIME: 8/16/2020 12:06 AM     INDICATION: Epigastric abdominal pain  COMPARISON: 01/17/2019  TECHNIQUE: Limited abdominal ultrasound.     FINDINGS:     GALLBLADDER: Cholelithiasis with a stone near the gallbladder neck. Normal wall thickness. Negative sonographic Savage's.     BILE DUCTS: No biliary dilatation. The common duct measures 6 mm.     LIVER: Normal parenchyma with smooth contour. No focal mass.     RIGHT KIDNEY: No hydronephrosis.     PANCREAS: The visualized portions are normal.     No ascites.                                                                       IMPRESSION:  1.  Cholelithiasis as on prior.          Preop Questions 12/11/2020   1. Have you ever had a heart attack or stroke? No   2. Have you ever had surgery on your heart or blood vessels, such as a stent placement, a coronary artery bypass, or surgery on an artery in your head, neck, heart, or legs? No   3. Do you have chest pain with activity? No   4. Do you have a history of  heart failure? No   5. Do you currently have a cold, bronchitis or symptoms of other infection? No   6. Do you have a cough, shortness of breath, or wheezing? No   7. Do you or anyone in your family have previous history of blood clots? No   8. Do you or does anyone in your family have a serious bleeding problem such as prolonged bleeding following surgeries or cuts? No   9. Have you ever had problems with anemia or been told to take iron pills? No   10. Have you had any abnormal blood loss such as black, tarry or bloody stools? No   11. Have you ever had a blood transfusion? No   12. Are you willing to have a blood transfusion if it is medically needed before, during, or after your surgery? Yes   13. Have you or any of your relatives ever had problems with anesthesia? No   14. Do you have sleep apnea, excessive snoring or daytime drowsiness? No   15. Do you have any artifical heart valves or other implanted medical devices like a pacemaker, defibrillator, or continuous glucose monitor? No   16. Do you have artificial joints? No   17. Are you allergic to latex? No       Health Care Directive:  Patient does not have a Health Care Directive or Living Will: Discussed advance care planning with patient; however, patient declined at this time.    Preoperative Review of :   reviewed - no record of controlled substances prescribed.      Status of Chronic Conditions:  No Chronic Health Problems    Review of Systems  CONSTITUTIONAL: NEGATIVE for fever, chills, change in weight  INTEGUMENTARY/SKIN: NEGATIVE for worrisome rashes, moles  "or lesions  EYES: NEGATIVE for vision changes or irritation  ENT/MOUTH: NEGATIVE for ear, mouth and throat problems  RESP: NEGATIVE for significant cough or SOB  CV: NEGATIVE for chest pain, palpitations or peripheral edema  GI: intermittent bouts of biliary colic; gall stones noted on ER evaluation   : NEGATIVE for frequency, dysuria, or hematuria  MUSCULOSKELETAL: NEGATIVE for significant arthralgias or myalgia  NEURO: NEGATIVE for weakness, dizziness or paresthesias  ENDOCRINE: NEGATIVE for temperature intolerance, skin/hair changes  HEME: NEGATIVE for bleeding problems  PSYCHIATRIC: NEGATIVE for changes in mood or affect    Patient Active Problem List    Diagnosis Date Noted     Biliary colic 11/30/2020     Priority: Medium     Added automatically from request for surgery [id number]        History reviewed. No pertinent past medical history.  History reviewed. No pertinent surgical history.  No current outpatient medications on file.       No Known Allergies     Social History     Tobacco Use     Smoking status: Never Smoker     Smokeless tobacco: Never Used   Substance Use Topics     Alcohol use: Yes     Comment: 3 drinks per week     Family History   Problem Relation Age of Onset     Cancer Father         leukemia     Diabetes Daughter         type 1 diabetes      History   Drug Use No         Objective     /68   Pulse 74   Temp 98.1  F (36.7  C) (Oral)   Resp 16   Ht 1.791 m (5' 10.5\")   Wt 89 kg (196 lb 4.8 oz)   SpO2 99%   BMI 27.77 kg/m      Physical Exam    GENERAL APPEARANCE: healthy, alert and no distress     EYES: Eyes grossly normal to inspection     HENT: ear canals and TM's normal and nose and mouth without ulcers or lesions     NECK: no adenopathy, no asymmetry, masses, or scars and thyroid normal to palpation     RESP: lungs clear to auscultation - no rales, rhonchi or wheezes     CV: regular rates and rhythm, normal S1 S2, no S3 or S4 and no murmur, click or rub     ABDOMEN:  soft, " nontender, no HSM or masses and bowel sounds normal     MS: extremities normal- no gross deformities noted, no evidence of inflammation in joints, FROM in all extremities.     SKIN: no suspicious lesions or rashes     NEURO: Normal strength and tone, sensory exam grossly normal, mentation intact and speech normal     PSYCH: mentation appears normal. and affect normal/bright    Recent Labs   Lab Test 08/15/20  2323 01/17/19  0023   HGB 15.2 17.3    284   INR  --  0.94    138   POTASSIUM 3.4 3.2*   CR 0.96 0.88        Diagnostics:  Recent Results (from the past 168 hour(s))   Basic metabolic panel    Collection Time: 12/14/20  4:16 PM   Result Value Ref Range    Sodium 139 133 - 144 mmol/L    Potassium 4.0 3.4 - 5.3 mmol/L    Chloride 108 94 - 109 mmol/L    Carbon Dioxide 26 20 - 32 mmol/L    Anion Gap 5 3 - 14 mmol/L    Glucose 92 70 - 99 mg/dL    Urea Nitrogen 13 7 - 30 mg/dL    Creatinine 0.83 0.66 - 1.25 mg/dL    GFR Estimate >90 >60 mL/min/[1.73_m2]    GFR Estimate If Black >90 >60 mL/min/[1.73_m2]    Calcium 9.3 8.5 - 10.1 mg/dL   Hemoglobin A1c    Collection Time: 12/14/20  4:16 PM   Result Value Ref Range    Hemoglobin A1C 5.1 0 - 5.6 %   Hemoglobin    Collection Time: 12/14/20  4:16 PM   Result Value Ref Range    Hemoglobin 14.8 13.3 - 17.7 g/dL      No EKG this visit, completed in the last 90 days.    Revised Cardiac Risk Index (RCRI):  The patient has the following serious cardiovascular risks for perioperative complications:   - No serious cardiac risks = 0 points     RCRI Interpretation: 0 points: Class I (very low risk - 0.4% complication rate)         Assessment & Plan   The proposed surgical procedure is considered LOW risk.    (Z01.818) Preop general physical exam  (primary encounter diagnosis)  Comment: HEALTH CARE MAINTENANCE reviewed  Plan: proceed with surgery    (K80.10) Calculus of gallbladder with chronic cholecystitis without obstruction  Comment: no elevation of LFTs  Plan:  proceed with elective laparoscopic cholecystectomy; reviewed expectations following removal of GB including risk for diarrhea    (E87.6) Low blood potassium  Comment: in ER, Potassium was on the low side; recommend rupal  Plan: Basic metabolic panel          (R73.09) Elevated glucose  Comment: elevated glucose on labs done in ER; suspect it was not fasting; family hx of DM reviewed.   Plan: Hemoglobin A1c, Hemoglobin                Risks and Recommendations:  The patient has the following additional risks and recommendations for perioperative complications:   - No identified additional risk factors other than previously addressed    Medication Instructions:  Patient is on no chronic medications    RECOMMENDATION:  APPROVAL GIVEN to proceed with proposed procedure, without further diagnostic evaluation.    Signed Electronically by: MD Chika Davison MD  Internal Medicine  electronically signed      --Approval given to proceed with proposed procedure, without further diagnostic evaluation  --Pt advised to avoid NSAIDS (Motrin, Ibuprofen, Aleve or Naprosyn);  If needed, Tylenol or Acetaminophen are fine to use.  --meds reviewed; he is on no chronic, daily medications.   --Pain medications, time off from work and FMLA following surgery deferred to surgeon.    Covid test as ordered by surgery. Set for  12/15/20 at [facility].         Copy of this evaluation report is provided to requesting physician.    Preop [facility] Preop Guidelines    Revised Cardiac Risk Index

## 2020-12-15 DIAGNOSIS — Z11.59 ENCOUNTER FOR SCREENING FOR OTHER VIRAL DISEASES: ICD-10-CM

## 2020-12-15 LAB
ANION GAP SERPL CALCULATED.3IONS-SCNC: 5 MMOL/L (ref 3–14)
BUN SERPL-MCNC: 13 MG/DL (ref 7–30)
CALCIUM SERPL-MCNC: 9.3 MG/DL (ref 8.5–10.1)
CHLORIDE SERPL-SCNC: 108 MMOL/L (ref 94–109)
CO2 SERPL-SCNC: 26 MMOL/L (ref 20–32)
CREAT SERPL-MCNC: 0.83 MG/DL (ref 0.66–1.25)
GFR SERPL CREATININE-BSD FRML MDRD: >90 ML/MIN/{1.73_M2}
GLUCOSE SERPL-MCNC: 92 MG/DL (ref 70–99)
POTASSIUM SERPL-SCNC: 4 MMOL/L (ref 3.4–5.3)
SODIUM SERPL-SCNC: 139 MMOL/L (ref 133–144)

## 2020-12-15 PROCEDURE — U0003 INFECTIOUS AGENT DETECTION BY NUCLEIC ACID (DNA OR RNA); SEVERE ACUTE RESPIRATORY SYNDROME CORONAVIRUS 2 (SARS-COV-2) (CORONAVIRUS DISEASE [COVID-19]), AMPLIFIED PROBE TECHNIQUE, MAKING USE OF HIGH THROUGHPUT TECHNOLOGIES AS DESCRIBED BY CMS-2020-01-R: HCPCS | Performed by: SURGERY

## 2020-12-16 LAB
SARS-COV-2 RNA SPEC QL NAA+PROBE: NOT DETECTED
SPECIMEN SOURCE: NORMAL

## 2020-12-18 ENCOUNTER — SURGERY (OUTPATIENT)
Age: 45
End: 2020-12-18
Payer: COMMERCIAL

## 2020-12-18 ENCOUNTER — ANESTHESIA (OUTPATIENT)
Dept: SURGERY | Facility: CLINIC | Age: 45
End: 2020-12-18
Payer: COMMERCIAL

## 2020-12-18 ENCOUNTER — ANESTHESIA EVENT (OUTPATIENT)
Dept: SURGERY | Facility: CLINIC | Age: 45
End: 2020-12-18
Payer: COMMERCIAL

## 2020-12-18 ENCOUNTER — APPOINTMENT (OUTPATIENT)
Dept: SURGERY | Facility: PHYSICIAN GROUP | Age: 45
End: 2020-12-18
Payer: COMMERCIAL

## 2020-12-18 ENCOUNTER — HOSPITAL ENCOUNTER (OUTPATIENT)
Facility: CLINIC | Age: 45
Discharge: HOME | End: 2020-12-18
Attending: SURGERY | Admitting: SURGERY
Payer: COMMERCIAL

## 2020-12-18 VITALS
OXYGEN SATURATION: 97 % | TEMPERATURE: 97.5 F | WEIGHT: 191 LBS | SYSTOLIC BLOOD PRESSURE: 130 MMHG | BODY MASS INDEX: 26.74 KG/M2 | DIASTOLIC BLOOD PRESSURE: 78 MMHG | HEART RATE: 49 BPM | HEIGHT: 71 IN | RESPIRATION RATE: 14 BRPM

## 2020-12-18 DIAGNOSIS — K80.50 BILIARY COLIC: ICD-10-CM

## 2020-12-18 PROCEDURE — 272N000001 HC OR GENERAL SUPPLY STERILE: Performed by: SURGERY

## 2020-12-18 PROCEDURE — 761N000002 HC RECOVERY PHASE 1 LEVEL 1 EA ADDTL HR: Performed by: SURGERY

## 2020-12-18 PROCEDURE — 258N000003 HC RX IP 258 OP 636: Performed by: NURSE ANESTHETIST, CERTIFIED REGISTERED

## 2020-12-18 PROCEDURE — 761N000007 HC RECOVERY PHASE 2 EACH 15 MINS: Performed by: SURGERY

## 2020-12-18 PROCEDURE — 88304 TISSUE EXAM BY PATHOLOGIST: CPT | Mod: 26 | Performed by: PATHOLOGY

## 2020-12-18 PROCEDURE — 360N000021 HC SURGERY LEVEL 3 EA 15 ADDTL MIN: Performed by: SURGERY

## 2020-12-18 PROCEDURE — 250N000009 HC RX 250: Performed by: NURSE ANESTHETIST, CERTIFIED REGISTERED

## 2020-12-18 PROCEDURE — 370N000001 HC ANESTHESIA TECHNICAL FEE, 1ST 30 MIN: Performed by: SURGERY

## 2020-12-18 PROCEDURE — 250N000011 HC RX IP 250 OP 636: Performed by: SURGERY

## 2020-12-18 PROCEDURE — 47562 LAPAROSCOPIC CHOLECYSTECTOMY: CPT | Mod: AS | Performed by: PHYSICIAN ASSISTANT

## 2020-12-18 PROCEDURE — 360N000020 HC SURGERY LEVEL 3 1ST 30 MIN: Performed by: SURGERY

## 2020-12-18 PROCEDURE — 258N000003 HC RX IP 258 OP 636: Performed by: ANESTHESIOLOGY

## 2020-12-18 PROCEDURE — 258N000001 HC RX 258: Performed by: SURGERY

## 2020-12-18 PROCEDURE — 999N000136 HC STATISTIC PRE PROC ASSESS II: Performed by: SURGERY

## 2020-12-18 PROCEDURE — 250N000011 HC RX IP 250 OP 636: Performed by: NURSE ANESTHETIST, CERTIFIED REGISTERED

## 2020-12-18 PROCEDURE — 250N000009 HC RX 250: Performed by: SURGERY

## 2020-12-18 PROCEDURE — 88304 TISSUE EXAM BY PATHOLOGIST: CPT | Mod: TC | Performed by: SURGERY

## 2020-12-18 PROCEDURE — 370N000002 HC ANESTHESIA TECHNICAL FEE, EACH ADDTL 15 MIN: Performed by: SURGERY

## 2020-12-18 PROCEDURE — 761N000001 HC RECOVERY PHASE 1 LEVEL 1 FIRST HR: Performed by: SURGERY

## 2020-12-18 PROCEDURE — 250N000013 HC RX MED GY IP 250 OP 250 PS 637: Performed by: ANESTHESIOLOGY

## 2020-12-18 PROCEDURE — 47562 LAPAROSCOPIC CHOLECYSTECTOMY: CPT | Performed by: SURGERY

## 2020-12-18 RX ORDER — FENTANYL CITRATE 50 UG/ML
25-50 INJECTION, SOLUTION INTRAMUSCULAR; INTRAVENOUS
Status: DISCONTINUED | OUTPATIENT
Start: 2020-12-18 | End: 2020-12-18 | Stop reason: HOSPADM

## 2020-12-18 RX ORDER — GLYCOPYRROLATE 0.2 MG/ML
INJECTION, SOLUTION INTRAMUSCULAR; INTRAVENOUS PRN
Status: DISCONTINUED | OUTPATIENT
Start: 2020-12-18 | End: 2020-12-18

## 2020-12-18 RX ORDER — PROPOFOL 10 MG/ML
INJECTION, EMULSION INTRAVENOUS PRN
Status: DISCONTINUED | OUTPATIENT
Start: 2020-12-18 | End: 2020-12-18

## 2020-12-18 RX ORDER — SODIUM CHLORIDE, SODIUM LACTATE, POTASSIUM CHLORIDE, CALCIUM CHLORIDE 600; 310; 30; 20 MG/100ML; MG/100ML; MG/100ML; MG/100ML
INJECTION, SOLUTION INTRAVENOUS CONTINUOUS
Status: DISCONTINUED | OUTPATIENT
Start: 2020-12-18 | End: 2020-12-18 | Stop reason: HOSPADM

## 2020-12-18 RX ORDER — MEPERIDINE HYDROCHLORIDE 25 MG/ML
12.5 INJECTION INTRAMUSCULAR; INTRAVENOUS; SUBCUTANEOUS
Status: DISCONTINUED | OUTPATIENT
Start: 2020-12-18 | End: 2020-12-18 | Stop reason: HOSPADM

## 2020-12-18 RX ORDER — ONDANSETRON 2 MG/ML
4 INJECTION INTRAMUSCULAR; INTRAVENOUS EVERY 30 MIN PRN
Status: DISCONTINUED | OUTPATIENT
Start: 2020-12-18 | End: 2020-12-18 | Stop reason: HOSPADM

## 2020-12-18 RX ORDER — HYDROCODONE BITARTRATE AND ACETAMINOPHEN 5; 325 MG/1; MG/1
1 TABLET ORAL
Status: DISCONTINUED | OUTPATIENT
Start: 2020-12-18 | End: 2020-12-18 | Stop reason: HOSPADM

## 2020-12-18 RX ORDER — NALOXONE HYDROCHLORIDE 0.4 MG/ML
0.4 INJECTION, SOLUTION INTRAMUSCULAR; INTRAVENOUS; SUBCUTANEOUS
Status: DISCONTINUED | OUTPATIENT
Start: 2020-12-18 | End: 2020-12-18 | Stop reason: HOSPADM

## 2020-12-18 RX ORDER — CEFAZOLIN SODIUM 2 G/100ML
2 INJECTION, SOLUTION INTRAVENOUS
Status: COMPLETED | OUTPATIENT
Start: 2020-12-18 | End: 2020-12-18

## 2020-12-18 RX ORDER — ALBUTEROL SULFATE 0.83 MG/ML
2.5 SOLUTION RESPIRATORY (INHALATION) EVERY 4 HOURS PRN
Status: DISCONTINUED | OUTPATIENT
Start: 2020-12-18 | End: 2020-12-18 | Stop reason: HOSPADM

## 2020-12-18 RX ORDER — BUPIVACAINE HYDROCHLORIDE 5 MG/ML
INJECTION, SOLUTION EPIDURAL; INTRACAUDAL PRN
Status: DISCONTINUED | OUTPATIENT
Start: 2020-12-18 | End: 2020-12-18 | Stop reason: HOSPADM

## 2020-12-18 RX ORDER — LABETALOL 20 MG/4 ML (5 MG/ML) INTRAVENOUS SYRINGE
10
Status: DISCONTINUED | OUTPATIENT
Start: 2020-12-18 | End: 2020-12-18 | Stop reason: HOSPADM

## 2020-12-18 RX ORDER — NALOXONE HYDROCHLORIDE 0.4 MG/ML
0.2 INJECTION, SOLUTION INTRAMUSCULAR; INTRAVENOUS; SUBCUTANEOUS
Status: DISCONTINUED | OUTPATIENT
Start: 2020-12-18 | End: 2020-12-18 | Stop reason: HOSPADM

## 2020-12-18 RX ORDER — CEFAZOLIN SODIUM 1 G/3ML
1 INJECTION, POWDER, FOR SOLUTION INTRAMUSCULAR; INTRAVENOUS SEE ADMIN INSTRUCTIONS
Status: DISCONTINUED | OUTPATIENT
Start: 2020-12-18 | End: 2020-12-18 | Stop reason: HOSPADM

## 2020-12-18 RX ORDER — HYDROCODONE BITARTRATE AND ACETAMINOPHEN 5; 325 MG/1; MG/1
1-2 TABLET ORAL EVERY 4 HOURS PRN
Qty: 10 TABLET | Refills: 0 | Status: SHIPPED | OUTPATIENT
Start: 2020-12-18

## 2020-12-18 RX ORDER — ONDANSETRON 4 MG/1
4 TABLET, ORALLY DISINTEGRATING ORAL EVERY 30 MIN PRN
Status: DISCONTINUED | OUTPATIENT
Start: 2020-12-18 | End: 2020-12-18 | Stop reason: HOSPADM

## 2020-12-18 RX ORDER — DEXAMETHASONE SODIUM PHOSPHATE 4 MG/ML
INJECTION, SOLUTION INTRA-ARTICULAR; INTRALESIONAL; INTRAMUSCULAR; INTRAVENOUS; SOFT TISSUE PRN
Status: DISCONTINUED | OUTPATIENT
Start: 2020-12-18 | End: 2020-12-18

## 2020-12-18 RX ORDER — HYDROMORPHONE HYDROCHLORIDE 1 MG/ML
.3-.5 INJECTION, SOLUTION INTRAMUSCULAR; INTRAVENOUS; SUBCUTANEOUS EVERY 10 MIN PRN
Status: DISCONTINUED | OUTPATIENT
Start: 2020-12-18 | End: 2020-12-18 | Stop reason: HOSPADM

## 2020-12-18 RX ORDER — LIDOCAINE HYDROCHLORIDE 10 MG/ML
INJECTION, SOLUTION INFILTRATION; PERINEURAL PRN
Status: DISCONTINUED | OUTPATIENT
Start: 2020-12-18 | End: 2020-12-18

## 2020-12-18 RX ORDER — METOPROLOL TARTRATE 1 MG/ML
INJECTION, SOLUTION INTRAVENOUS PRN
Status: DISCONTINUED | OUTPATIENT
Start: 2020-12-18 | End: 2020-12-18

## 2020-12-18 RX ORDER — ONDANSETRON 2 MG/ML
INJECTION INTRAMUSCULAR; INTRAVENOUS PRN
Status: DISCONTINUED | OUTPATIENT
Start: 2020-12-18 | End: 2020-12-18

## 2020-12-18 RX ORDER — CELECOXIB 200 MG/1
400 CAPSULE ORAL ONCE
Status: COMPLETED | OUTPATIENT
Start: 2020-12-18 | End: 2020-12-18

## 2020-12-18 RX ORDER — HYDRALAZINE HYDROCHLORIDE 20 MG/ML
2.5-5 INJECTION INTRAMUSCULAR; INTRAVENOUS EVERY 10 MIN PRN
Status: DISCONTINUED | OUTPATIENT
Start: 2020-12-18 | End: 2020-12-18 | Stop reason: HOSPADM

## 2020-12-18 RX ORDER — NEOSTIGMINE METHYLSULFATE 1 MG/ML
VIAL (ML) INJECTION PRN
Status: DISCONTINUED | OUTPATIENT
Start: 2020-12-18 | End: 2020-12-18

## 2020-12-18 RX ORDER — ACETAMINOPHEN 325 MG/1
975 TABLET ORAL ONCE
Status: COMPLETED | OUTPATIENT
Start: 2020-12-18 | End: 2020-12-18

## 2020-12-18 RX ORDER — LIDOCAINE 40 MG/G
CREAM TOPICAL
Status: DISCONTINUED | OUTPATIENT
Start: 2020-12-18 | End: 2020-12-18 | Stop reason: HOSPADM

## 2020-12-18 RX ORDER — FENTANYL CITRATE 50 UG/ML
INJECTION, SOLUTION INTRAMUSCULAR; INTRAVENOUS PRN
Status: DISCONTINUED | OUTPATIENT
Start: 2020-12-18 | End: 2020-12-18

## 2020-12-18 RX ADMIN — CELECOXIB 400 MG: 200 CAPSULE ORAL at 08:22

## 2020-12-18 RX ADMIN — DEXAMETHASONE SODIUM PHOSPHATE 4 MG: 4 INJECTION, SOLUTION INTRA-ARTICULAR; INTRALESIONAL; INTRAMUSCULAR; INTRAVENOUS; SOFT TISSUE at 09:06

## 2020-12-18 RX ADMIN — METOPROLOL TARTRATE 2 MG: 5 INJECTION INTRAVENOUS at 09:23

## 2020-12-18 RX ADMIN — PHENYLEPHRINE HYDROCHLORIDE 100 MCG: 10 INJECTION INTRAVENOUS at 09:06

## 2020-12-18 RX ADMIN — ONDANSETRON HYDROCHLORIDE 4 MG: 2 INJECTION, SOLUTION INTRAVENOUS at 09:17

## 2020-12-18 RX ADMIN — PROPOFOL 200 MG: 10 INJECTION, EMULSION INTRAVENOUS at 09:06

## 2020-12-18 RX ADMIN — SODIUM CHLORIDE, POTASSIUM CHLORIDE, SODIUM LACTATE AND CALCIUM CHLORIDE: 600; 310; 30; 20 INJECTION, SOLUTION INTRAVENOUS at 09:40

## 2020-12-18 RX ADMIN — HYDROMORPHONE HYDROCHLORIDE 1 MG: 1 INJECTION, SOLUTION INTRAMUSCULAR; INTRAVENOUS; SUBCUTANEOUS at 09:15

## 2020-12-18 RX ADMIN — GLYCOPYRROLATE 0.6 MG: 0.2 INJECTION, SOLUTION INTRAMUSCULAR; INTRAVENOUS at 09:52

## 2020-12-18 RX ADMIN — MIDAZOLAM 2 MG: 1 INJECTION INTRAMUSCULAR; INTRAVENOUS at 08:57

## 2020-12-18 RX ADMIN — ACETAMINOPHEN 975 MG: 325 TABLET, FILM COATED ORAL at 08:22

## 2020-12-18 RX ADMIN — SODIUM CHLORIDE, POTASSIUM CHLORIDE, SODIUM LACTATE AND CALCIUM CHLORIDE: 600; 310; 30; 20 INJECTION, SOLUTION INTRAVENOUS at 08:59

## 2020-12-18 RX ADMIN — LIDOCAINE HYDROCHLORIDE 50 MG: 10 INJECTION, SOLUTION INFILTRATION; PERINEURAL at 09:06

## 2020-12-18 RX ADMIN — SODIUM CHLORIDE 700 ML: 900 IRRIGANT IRRIGATION at 09:50

## 2020-12-18 RX ADMIN — FENTANYL CITRATE 100 MCG: 50 INJECTION, SOLUTION INTRAMUSCULAR; INTRAVENOUS at 09:06

## 2020-12-18 RX ADMIN — CEFAZOLIN SODIUM 2 G: 2 INJECTION, SOLUTION INTRAVENOUS at 09:14

## 2020-12-18 RX ADMIN — Medication 5 MG: at 09:52

## 2020-12-18 RX ADMIN — ROCURONIUM BROMIDE 50 MG: 10 INJECTION INTRAVENOUS at 09:07

## 2020-12-18 RX ADMIN — BUPIVACAINE HYDROCHLORIDE 30 ML: 5 INJECTION, SOLUTION EPIDURAL; INTRACAUDAL at 09:48

## 2020-12-18 ASSESSMENT — MIFFLIN-ST. JEOR: SCORE: 1775.56

## 2020-12-18 NOTE — BRIEF OP NOTE
[facility]    Brief Operative Note    Pre-operative diagnosis: Biliary colic [K80.50]  Post-operative diagnosis Biliary colic    Procedure: Procedure(s):  LAPAROSCOPIC CHOLECYSTECTOMY  Surgeon: Surgeon(s) and Role:     * Jacob Edwards MD - Primary     * Elisa Frye PA-C - Assisting  Anesthesia: General   Estimated blood loss: Less than 10 ml  Drains: None  Specimens:   ID Type Source Tests Collected by Time Destination   A : GALLBLADDER AND CONTENTS Tissue Gallbladder and Contents SURGICAL PATHOLOGY EXAM Jacob Edwards MD 12/18/2020  9:44 AM      Findings:   Gallstones, no acute inflammation..  Complications: None.  Implants: * No implants in log *

## 2020-12-18 NOTE — ANESTHESIA PREPROCEDURE EVALUATION
Anesthesia Pre-Procedure Evaluation    Patient: Rashad Traore   MRN: [id number] : 1975          Preoperative Diagnosis: Biliary colic [K80.50]    Procedure(s):  LAPAROSCOPIC CHOLECYSTECTOMY    No past medical history on file.  History reviewed. No pertinent surgical history.  Anesthesia Evaluation     . Pt has not had prior anesthetic            ROS/MED HX    ENT/Pulmonary:  - neg pulmonary ROS    (-) asthma   Neurologic:  - neg neurologic ROS     Cardiovascular:  - neg cardiovascular ROS      (-) CAD and taking anticoagulants/antiplatelets   METS/Exercise Tolerance:     Hematologic:  - neg hematologic  ROS       Musculoskeletal:  - neg musculoskeletal ROS       GI/Hepatic:     (+) cholecystitis/cholelithiasis,       Renal/Genitourinary:  - ROS Renal section negative       Endo:  - neg endo ROS       Psychiatric:  - neg psychiatric ROS       Infectious Disease:  - neg infectious disease ROS       Malignancy:         Other:                          Physical Exam  Normal systems: cardiovascular, pulmonary and dental    Airway   Mallampati: II  TM distance: >3 FB  Neck ROM: full    Dental     Cardiovascular       Pulmonary             Lab Results   Component Value Date    WBC 16.4 (H) 08/15/2020    HGB 14.8 2020    HCT 46.1 08/15/2020     08/15/2020     2020    POTASSIUM 4.0 2020    CHLORIDE 108 2020    CO2 26 2020    BUN 13 2020    CR 0.83 2020    GLC 92 2020    KARIS 9.3 2020    ALBUMIN 4.6 08/15/2020    PROTTOTAL 8.0 08/15/2020    ALT 46 08/15/2020    AST 26 08/15/2020    ALKPHOS 95 08/15/2020    BILITOTAL 0.5 08/15/2020    LIPASE 133 08/15/2020    INR 0.94 2019       Preop Vitals  BP Readings from Last 3 Encounters:   20 (!) 140/91   20 120/68   20 (!) 139/91    Pulse Readings from Last 3 Encounters:   20 74   20 59   19 97      Resp Readings from Last 3 Encounters:   20 16   20 16  "  08/15/20 20    SpO2 Readings from Last 3 Encounters:   12/18/20 97%   12/14/20 99%   08/16/20 100%      Temp Readings from Last 1 Encounters:   12/18/20 98.4  F (36.9  C) (Temporal)    Ht Readings from Last 1 Encounters:   12/18/20 1.791 m (5' 10.5\")      Wt Readings from Last 1 Encounters:   12/18/20 86.6 kg (191 lb)    Estimated body mass index is 27.02 kg/m  as calculated from the following:    Height as of this encounter: 1.791 m (5' 10.5\").    Weight as of this encounter: 86.6 kg (191 lb).       Anesthesia Plan      History & Physical Review  History and physical reviewed and following examination; no interval change.    ASA Status:  2 .    NPO Status:  > 8 hours    Plan for General with Intravenous induction. Maintenance will be Inhalation.    PONV prophylaxis:  Ondansetron (or other 5HT-3) and Dexamethasone or Solumedrol         Postoperative Care  Postoperative pain management:  IV analgesics, Oral pain medications and Multi-modal analgesia.      Consents  Anesthetic plan, risks, benefits and alternatives discussed with:  Patient..                 Salty Aguilar MD                    .  "

## 2020-12-18 NOTE — OP NOTE
[facility] General Surgery Operative Note    Pre-operative diagnosis: symptomatic gallstones   Post-operative diagnosis: same   Procedure: laparoscopic cholecystectomy   Surgeon: Jacob Edwards MD   Assistant(s): Elisa Frye PA-C  The Physician Assistant was medically necessary for their expertise in prepping, camera management, suctioning, suturing and retraction.   Anesthesia: general   Estimated blood loss:  Specimen: 5 cc  gallbladder and contents               DESCRIPTION OF PROCEDURE:  The patient was taken to the operating room and placed on the table in supine position.  General endotracheal anesthesia was induced and the abdomen was prepped and draped in standard sterile fashion.  An incision above the umbilicus was made with a blade.  The incision was carried down to the fascia.  The fascia was incised in the midline with a blade.  The peritoneum was entered bluntly with a Carmalt clamp.  Two interrupted 0 Vicryl sutures were placed at the extremes of this fascial incision.  The Cari trocar was introduced and the abdomen was insufflated with CO2.  A 5 mm trocar was placed in the subxiphoid position.  A 5 mm trocar was placed in the right upper quadrant, just below the costal margin at the midclavicular line.  Another was placed at the anterior axillary line just below the costal margin on the right.  The patient was placed in reverse Trendelenburg and right side up.  The gallbladder appeared normal.  The fundus of the gallbladder was grasped and retracted cephalad.  The infundibulum was grasped and retracted laterally.  The peritoneum over the medial and lateral aspects of the triangle of Calot was taken down with the Maryland dissector and modest amounts of Bovie electrocautery.  The cystic duct and artery were freed up from surrounding tissues.  The triangle of Calot was skeletonized revealing the critical view of safety.  This revealed no additional ducts or vessels.  The cystic  artery and duct were each clipped twice proximally, once distally and transected with the hook scissors.  The gallbladder was then removed from the liver using the hook electrocautery.  The gallbladder was passed into an Endocatch bag and removed through the umbilical trocar site.  We observed the right upper quadrant carefully for hemostasis.  Hemostasis was assured.  We irrigated with copious amounts of sterile saline and aspirated the effluent.  The right upper quadrant trocar sites were anesthetized with local anesthetic.  Each of the trocars was removed under direct visualization.  There was no bleeding from any of these sites.  The Cari trocar was removed and the abdomen was evacuated of CO2. An additional interrupted 0 Vicryl suture was placed in the umbilical trocar site fascia.  Each of the three 0 Vicryl sutures was cinched down and tied.  The skin of the umbilical incision was anesthetized with local anesthetic.  All of the incisions were closed with interrupted 4-0 Vicryl subcuticular sutures and skin glue.  The patient tolerated the procedure well.  Sponge and instrument counts were correct.    Jacob Edwards MD

## 2020-12-18 NOTE — ANESTHESIA CARE TRANSFER NOTE
Patient: Rashad Traore    Procedure(s):  LAPAROSCOPIC CHOLECYSTECTOMY    Diagnosis: Biliary colic [K80.50]  Diagnosis Additional Information: No value filed.    Anesthesia Type:   General     Note:  Airway :Face Mask  Patient transferred to:PACU  Comments: Pt to PACU without issue. VSS. SBB. RRR. Pt resting. Report to RN at bedside. Handoff Report: Identifed the Patient, Identified the Reponsible Provider, Reviewed the pertinent medical history, Discussed the surgical course, Reviewed Intra-OP anesthesia mangement and issues during anesthesia, Set expectations for post-procedure period and Allowed opportunity for questions and acknowledgement of understanding      Vitals: (Last set prior to Anesthesia Care Transfer)    CRNA VITALS  12/18/2020 0930 - 12/18/2020 1005      12/18/2020             Pulse:  83    SpO2:  99 %    Resp Rate (observed):  8                Electronically Signed By: GERSON Rhoades CRNA  December 18, 2020  10:05 AM

## 2020-12-18 NOTE — ANESTHESIA POSTPROCEDURE EVALUATION
Patient: Rashad Traore    Procedure(s):  LAPAROSCOPIC CHOLECYSTECTOMY    Diagnosis:Biliary colic [K80.50]  Diagnosis Additional Information: No value filed.    Anesthesia Type:  General    Note:  Anesthesia Post Evaluation    Patient location during evaluation: PACU  Patient participation: Able to fully participate in evaluation  Level of consciousness: awake and alert  Pain management: adequate  Airway patency: patent  Cardiovascular status: acceptable  Respiratory status: acceptable  Hydration status: acceptable  PONV: controlled             Last vitals:  Vitals:    12/18/20 1100 12/18/20 1115 12/18/20 1128   BP: (!) 139/97 135/89 136/87   Pulse: 53 58 (!) 49   Resp: 14 24 14   Temp: 97.2  F (36.2  C) 97  F (36.1  C) 95.9  F (35.5  C)   SpO2: 99% 97% 97%         Electronically Signed By: Salty Aguilar MD  December 18, 2020  11:30 AM

## 2020-12-21 LAB — COPATH REPORT: NORMAL

## 2021-01-14 ENCOUNTER — HEALTH MAINTENANCE LETTER (OUTPATIENT)
Age: 46
End: 2021-01-14

## 2021-10-23 ENCOUNTER — HEALTH MAINTENANCE LETTER (OUTPATIENT)
Age: 46
End: 2021-10-23

## 2022-02-12 ENCOUNTER — HEALTH MAINTENANCE LETTER (OUTPATIENT)
Age: 47
End: 2022-02-12

## 2022-10-10 ENCOUNTER — HEALTH MAINTENANCE LETTER (OUTPATIENT)
Age: 47
End: 2022-10-10

## 2022-11-19 ENCOUNTER — APPOINTMENT (OUTPATIENT)
Dept: CT IMAGING | Facility: CLINIC | Age: 47
End: 2022-11-19
Attending: EMERGENCY MEDICINE
Payer: COMMERCIAL

## 2022-11-19 ENCOUNTER — HOSPITAL ENCOUNTER (EMERGENCY)
Facility: CLINIC | Age: 47
Discharge: HOME | End: 2022-11-19
Attending: EMERGENCY MEDICINE | Admitting: EMERGENCY MEDICINE
Payer: COMMERCIAL

## 2022-11-19 VITALS
WEIGHT: 190 LBS | SYSTOLIC BLOOD PRESSURE: 164 MMHG | BODY MASS INDEX: 26.88 KG/M2 | DIASTOLIC BLOOD PRESSURE: 118 MMHG | TEMPERATURE: 98 F | HEART RATE: 104 BPM | OXYGEN SATURATION: 99 % | RESPIRATION RATE: 18 BRPM

## 2022-11-19 DIAGNOSIS — N23 URETERAL COLIC: ICD-10-CM

## 2022-11-19 LAB
ALBUMIN UR-MCNC: 30 MG/DL
ANION GAP SERPL CALCULATED.3IONS-SCNC: 13 MMOL/L (ref 7–15)
APPEARANCE UR: CLEAR
BASOPHILS # BLD AUTO: 0 10E3/UL (ref 0–0.2)
BASOPHILS NFR BLD AUTO: 1 %
BILIRUB UR QL STRIP: NEGATIVE
BUN SERPL-MCNC: 20.2 MG/DL (ref 6–20)
CALCIUM SERPL-MCNC: 9.4 MG/DL (ref 8.6–10)
CHLORIDE SERPL-SCNC: 104 MMOL/L (ref 98–107)
COLOR UR AUTO: YELLOW
CREAT SERPL-MCNC: 1.15 MG/DL (ref 0.67–1.17)
DEPRECATED HCO3 PLAS-SCNC: 24 MMOL/L (ref 22–29)
EOSINOPHIL # BLD AUTO: 0.2 10E3/UL (ref 0–0.7)
EOSINOPHIL NFR BLD AUTO: 3 %
ERYTHROCYTE [DISTWIDTH] IN BLOOD BY AUTOMATED COUNT: 12.3 % (ref 10–15)
GFR SERPL CREATININE-BSD FRML MDRD: 80 ML/MIN/1.73M2
GLUCOSE SERPL-MCNC: 132 MG/DL (ref 70–99)
GLUCOSE UR STRIP-MCNC: NEGATIVE MG/DL
HCT VFR BLD AUTO: 47.1 % (ref 40–53)
HGB BLD-MCNC: 15.6 G/DL (ref 13.3–17.7)
HGB UR QL STRIP: ABNORMAL
IMM GRANULOCYTES # BLD: 0 10E3/UL
IMM GRANULOCYTES NFR BLD: 0 %
KETONES UR STRIP-MCNC: NEGATIVE MG/DL
LEUKOCYTE ESTERASE UR QL STRIP: NEGATIVE
LYMPHOCYTES # BLD AUTO: 2.1 10E3/UL (ref 0.8–5.3)
LYMPHOCYTES NFR BLD AUTO: 32 %
MCH RBC QN AUTO: 30.3 PG (ref 26.5–33)
MCHC RBC AUTO-ENTMCNC: 33.1 G/DL (ref 31.5–36.5)
MCV RBC AUTO: 92 FL (ref 78–100)
MONOCYTES # BLD AUTO: 0.6 10E3/UL (ref 0–1.3)
MONOCYTES NFR BLD AUTO: 9 %
MUCOUS THREADS #/AREA URNS LPF: PRESENT /LPF
NEUTROPHILS # BLD AUTO: 3.7 10E3/UL (ref 1.6–8.3)
NEUTROPHILS NFR BLD AUTO: 55 %
NITRATE UR QL: NEGATIVE
NRBC # BLD AUTO: 0 10E3/UL
NRBC BLD AUTO-RTO: 0 /100
PH UR STRIP: 6 [PH] (ref 5–7)
PLATELET # BLD AUTO: 224 10E3/UL (ref 150–450)
POTASSIUM SERPL-SCNC: 3.8 MMOL/L (ref 3.4–5.3)
RBC # BLD AUTO: 5.15 10E6/UL (ref 4.4–5.9)
RBC URINE: 53 /HPF
SODIUM SERPL-SCNC: 141 MMOL/L (ref 136–145)
SP GR UR STRIP: 1.03 (ref 1–1.03)
SQUAMOUS EPITHELIAL: <1 /HPF
UROBILINOGEN UR STRIP-MCNC: NORMAL MG/DL
WBC # BLD AUTO: 6.6 10E3/UL (ref 4–11)
WBC URINE: 1 /HPF

## 2022-11-19 PROCEDURE — 74176 CT ABD & PELVIS W/O CONTRAST: CPT

## 2022-11-19 PROCEDURE — 96374 THER/PROPH/DIAG INJ IV PUSH: CPT

## 2022-11-19 PROCEDURE — 99285 EMERGENCY DEPT VISIT HI MDM: CPT | Mod: 25

## 2022-11-19 PROCEDURE — 81003 URINALYSIS AUTO W/O SCOPE: CPT | Performed by: EMERGENCY MEDICINE

## 2022-11-19 PROCEDURE — 36415 COLL VENOUS BLD VENIPUNCTURE: CPT | Performed by: EMERGENCY MEDICINE

## 2022-11-19 PROCEDURE — 85025 COMPLETE CBC W/AUTO DIFF WBC: CPT | Performed by: EMERGENCY MEDICINE

## 2022-11-19 PROCEDURE — 80048 BASIC METABOLIC PNL TOTAL CA: CPT | Performed by: EMERGENCY MEDICINE

## 2022-11-19 PROCEDURE — 96375 TX/PRO/DX INJ NEW DRUG ADDON: CPT

## 2022-11-19 PROCEDURE — 250N000011 HC RX IP 250 OP 636: Performed by: EMERGENCY MEDICINE

## 2022-11-19 RX ORDER — ONDANSETRON 4 MG/1
4 TABLET, ORALLY DISINTEGRATING ORAL EVERY 8 HOURS PRN
Qty: 10 TABLET | Refills: 0 | Status: SHIPPED | OUTPATIENT
Start: 2022-11-19

## 2022-11-19 RX ORDER — HYDROMORPHONE HYDROCHLORIDE 1 MG/ML
0.5 INJECTION, SOLUTION INTRAMUSCULAR; INTRAVENOUS; SUBCUTANEOUS ONCE
Status: COMPLETED | OUTPATIENT
Start: 2022-11-19 | End: 2022-11-19

## 2022-11-19 RX ORDER — TAMSULOSIN HYDROCHLORIDE 0.4 MG/1
0.4 CAPSULE ORAL DAILY
Qty: 10 CAPSULE | Refills: 0 | Status: SHIPPED | OUTPATIENT
Start: 2022-11-19 | End: 2022-11-29

## 2022-11-19 RX ORDER — KETOROLAC TROMETHAMINE 15 MG/ML
15 INJECTION, SOLUTION INTRAMUSCULAR; INTRAVENOUS ONCE
Status: COMPLETED | OUTPATIENT
Start: 2022-11-19 | End: 2022-11-19

## 2022-11-19 RX ADMIN — KETOROLAC TROMETHAMINE 15 MG: 15 INJECTION, SOLUTION INTRAMUSCULAR; INTRAVENOUS at 04:14

## 2022-11-19 RX ADMIN — HYDROMORPHONE HYDROCHLORIDE 0.5 MG: 1 INJECTION, SOLUTION INTRAMUSCULAR; INTRAVENOUS; SUBCUTANEOUS at 04:15

## 2022-11-19 ASSESSMENT — ENCOUNTER SYMPTOMS
CHILLS: 0
VOMITING: 0
FEVER: 0
FLANK PAIN: 1
NAUSEA: 0
DIFFICULTY URINATING: 1

## 2022-11-19 NOTE — DISCHARGE INSTRUCTIONS
Follow-up:  Please follow-up with Urology or your primary care provider in 3-5 days for re-evaluation and discussion of your visit to the emergency department today.  Try to catch your kidney stone using the urinary strainer, and bring this with you to your urology appointment.  Determining what kind of kidney stone you have might help preventing them in the future.    Home treatments:  Recommended home therapies include drinking plenty of fluids to stay hydrated.  You may resume your regular diet.  Take your medications as prescribed.    New prescriptions:  Ibuprofen for pain    Zofran for nausea    Flomax to aid with stone passage.    Return precautions:  Warning signs which should prompt you to return to the ER include if you develop fever, develop recurrent vomiting, are unable to urinate, cannot keep fluids down, or become worse in any way.      Discharge Instructions  Kidney Stones    Kidney stones are a common problem that can cause a lot of pain but fortunately are usually not dangerous and can be generally treated with medicine at home.  However, sometimes your condition may be worse than it seemed at first, or may get worse with time.     You need to follow-up with your regular doctor within 3 days.    Most kidney stones will pass on their own, but occasionally stones may need to be removed by an urologist. We will send you home with a urine strainer. Be sure to urinate into this, or urinate into a container and pour the urine through the fine filter to catch the kidney stone as it comes out. The stone will seem like a pebble or grain of sand. Be sure to save this in a zip-lock bag and take it to the doctor s office with you.       Return to the Emergency Department if:  Your pain is not controlled.  You are vomiting and can t keep fluids or medications down.  You develop fever (>101)  You feel much more ill or develop new symptoms  What can I do to help myself?  Be sure to drink plenty of fluids  Staying  active is good, and may help the stone to pass. You may do whatever you feel up to doing without restrictions.   Treatment:  Non-steroidal anti-inflammatory drugs (NSAIDs). This includes prescription medicines like Toradol   and non-prescription medicines like ibuprofen (Advil , Nuprin  ). These pain relievers are very effective for kidney stones.  Narcotic pain pills. If you have been given a narcotic (such as codeine, hydrocodone, or oxycodone) do not drive for four hours after you have taken it. If the narcotic contains acetaminophen (Tylenol), do not take Tylenol with it. All narcotics will cause constipation, so eat a high fiber diet.    Nausea medication.  Nausea and vomiting are common with kidney stones, so your physician may send you home with medicine for this.   Flomax (Tamsulosin). This medicine is sometimes used for men with prostate problems, but also can help kidney stones to pass. This medicine can lower blood pressure, and you may feel faint, especially when you first stand up. Be sure to get up gradually, sit down if you feel faint, and avoid activity where feeling faint would be dangerous, such as climbing ladders.     Remember that you can always come back to the Emergency Department if you are not able to see your regular doctor in the amount of time listed above, if you get any new symptoms, or if there is anything that worries you.      Opioid Medication Information    You have been given a prescription for an opioid (narcotic) pain medicine and/or have received a pain medicine while here in the Emergency Department. These medicines can make you drowsy or impaired. You must not drive, operate dangerous equipment, or engage in any other dangerous activities while taking these medications. If you drive while taking these medications, you could be arrested for DUI, or driving under the influence. Do not drink any alcohol while you are taking these medications.   Opioid pain medications can cause  addiction. If you have a history of chemical dependency of any type, you are at a higher risk of becoming addicted to pain medications.  Only take these prescribed medications to treat your pain when all other options have been tried. Take it for as short a time and as few doses as possible. Store your pain pills in a secure place, as they are frequently stolen and provide a dangerous opportunity for children or visitors in your house to start abusing these powerful medications. We will not replace any lost or stolen medicine.  As soon as your pain is better, you should flush all your remaining medication.   Many prescription pain medications contain Tylenol  (acetaminophen), including Vicodin , Tylenol #3 , Norco , Lortab , and Percocet .  You should not take any extra pills of Tylenol  if you are using these prescription medications or you can get very sick.  Do not ever take more than 4000 mg of acetaminophen in any 24 hour period.  All opioids tend to cause constipation. Drink plenty of water and eat foods that have a lot of fiber, such as fruits, vegetables, prune juice, apple juice and high fiber cereal.  Take a laxative if you don t move your bowels at least every other day. Miralax , Milk of Magnesia, Colace , or Senna  can be used to keep you regular.

## 2022-11-19 NOTE — ED PROVIDER NOTES
History   Chief Complaint:  No chief complaint on file.       HPI   Rashad Traore is a 47 year old male with history of Biliary colic who presents with sudden onset left sided flank pain since this morning. Endorses urgency and difficulty but denies nausea, vomiting, fever, chills, and personal history of kidney stones though his uncle has had kidney stones.  Received analgesia prior to my evaluation, and noting improvement in symptoms.    Review of Systems   Constitutional: Negative for chills and fever.   Gastrointestinal: Negative for nausea and vomiting.   Genitourinary: Positive for difficulty urinating, flank pain and urgency.   All other systems reviewed and are negative.    Allergies:  No known drug allergies     Medications:  norco    Past Medical History:     Biliary colic     Past Surgical History:    Cholecystectomy    Family History:    Father: leukemia    Social History:  The patient presents to the ED with alone    Physical Exam     Patient Vitals for the past 24 hrs:   BP Temp Temp src Pulse Resp SpO2 Weight   11/19/22 0417 (!) 164/118 98  F (36.7  C) Temporal 104 18 99 % 86.2 kg (190 lb)       Physical Exam  General:              Well-nourished              Speaking in full sentences  Eyes:              Conjunctiva without injection or scleral icterus  ENT:              Moist mucous membranes              Nares patent              Pinnae normal  Neck:              Full ROM              No stiffness appreciated  Resp:              Even, non-labored respirations  CV:                    RRR  GI:              BS present              Abdomen soft without distention              Non-tender to light and deep palpation              No guarding or rebound tenderness  Skin:              Warm, dry, well perfused              No rashes or open wounds on exposed skin  MSK:              Moves all extremities              No focal deformities or swelling  Neuro:              Alert              Answers questions  appropriately              Moves all extremities equally              Gait stable  Psych:              Normal affect, normal mood    Emergency Department Course   Imaging:  Abd/pelvis CT no contrast - Stone Protocol   Final Result   IMPRESSION:    1.  2 mm calculus left UVJ. Mild left hydronephrosis.           Report per radiology    Laboratory:  Labs Ordered and Resulted from Time of ED Arrival to Time of ED Departure   BASIC METABOLIC PANEL - Abnormal       Result Value    Sodium 141      Potassium 3.8      Chloride 104      Carbon Dioxide (CO2) 24      Anion Gap 13      Urea Nitrogen 20.2 (*)     Creatinine 1.15      Calcium 9.4      Glucose 132 (*)     GFR Estimate 80     ROUTINE UA WITH MICROSCOPIC REFLEX TO CULTURE - Abnormal    Color Urine Yellow      Appearance Urine Clear      Glucose Urine Negative      Bilirubin Urine Negative      Ketones Urine Negative      Specific Gravity Urine 1.034      Blood Urine Large (*)     pH Urine 6.0      Protein Albumin Urine 30 (*)     Urobilinogen Urine Normal      Nitrite Urine Negative      Leukocyte Esterase Urine Negative      Mucus Urine Present (*)     RBC Urine 53 (*)     WBC Urine 1      Squamous Epithelials Urine <1     CBC WITH PLATELETS AND DIFFERENTIAL    WBC Count 6.6      RBC Count 5.15      Hemoglobin 15.6      Hematocrit 47.1      MCV 92      MCH 30.3      MCHC 33.1      RDW 12.3      Platelet Count 224      % Neutrophils 55      % Lymphocytes 32      % Monocytes 9      % Eosinophils 3      % Basophils 1      % Immature Granulocytes 0      NRBCs per 100 WBC 0      Absolute Neutrophils 3.7      Absolute Lymphocytes 2.1      Absolute Monocytes 0.6      Absolute Eosinophils 0.2      Absolute Basophils 0.0      Absolute Immature Granulocytes 0.0      Absolute NRBCs 0.0        Emergency Department Course:  Reviewed:  I reviewed nursing notes, vitals, past medical history and Care Everywhere    Assessments:  0744 I obtained history and examined the patient as  noted above.     Interventions:  0414 Toradol, 15 mg, IV   0415 Dilaudid, 0.5 mg, IV    Disposition:  The patient was discharged to home.     Impression & Plan   Medical Decision Making:  Rashad Traore is a 47 year old male who presented to the ER for evaluation of flank pain and abdominal pain.  Vital signs on presentation reveal elevated BP and HR, likely secondary to pain.  Differential diagnosis includes nephrolithiasis/renal colic, pyelonephritis, appendicitis, AAA, biliary colic, bowel obstruction, colitis, renal infarction, retroperitoneal disease, and testicular pathology such as torsion, epididymitis, hernia.       Patient's current presentation is felt to be most consistent with renal colic. CT confirms a 2 mm ureteral stone at the UVJ.  Renal function is normal/baseline.  CT and lab workup show no other alternative etiology that could be causing his symptoms (e.g., AAA, appendicitis, pyelonephritis). Incidental findings discussed including prostate calcifications (can discuss further with urology on F/U) as well as fat containing hernia.  There is no fever or convincing evidence of a urinary tract infection.      On recheck, his pain is controlled with interventions in the ED and he is tolerating POs. I will prescribe supportive medications and Flomax to facilitate stone passage. Discussed risks/benefits of Flomax. I have advised him to return for uncontrolled pain, vomiting, fever, or any other concerning symptoms. Declines opiate analgesia. I also advised to strain his urine to look for a stone and submit it to his primary doctor for lab analysis.  Finally, I have advised follow up with urology within 3-5 days.     Diagnosis:    ICD-10-CM    1. Ureteral colic  N23 Adult Urology  Referral          Discharge Medications:  New Prescriptions    ONDANSETRON (ZOFRAN ODT) 4 MG ODT TAB    Take 1 tablet (4 mg) by mouth every 8 hours as needed for nausea    TAMSULOSIN (FLOMAX) 0.4 MG CAPSULE    Take 1  capsule (0.4 mg) by mouth daily for 10 doses       Scribe Disclosure:  I, Peter Louise, am serving as a scribe at 7:20 AM on 11/19/2022 to document services personally performed by Nino Ivey MD based on my observations and the provider's statements to me.         Nino Ivey MD  11/19/22 6366

## 2022-11-23 ENCOUNTER — VIRTUAL VISIT (OUTPATIENT)
Dept: UROLOGY | Facility: CLINIC | Age: 47
End: 2022-11-23
Payer: COMMERCIAL

## 2022-11-23 DIAGNOSIS — N13.2 HYDRONEPHROSIS WITH URINARY OBSTRUCTION DUE TO URETERAL CALCULUS: ICD-10-CM

## 2022-11-23 DIAGNOSIS — N23 URETERAL COLIC: ICD-10-CM

## 2022-11-23 DIAGNOSIS — N20.1 CALCULUS OF URETER: Primary | ICD-10-CM

## 2022-11-23 PROCEDURE — 99203 OFFICE O/P NEW LOW 30 MIN: CPT | Mod: GT | Performed by: PHYSICIAN ASSISTANT

## 2022-11-23 ASSESSMENT — PAIN SCALES - GENERAL: PAINLEVEL: NO PAIN (0)

## 2022-11-23 NOTE — PROGRESS NOTES
Patient is roomed via telephone for a virtual visit.  Patient confirmed he is in the [facility] at the time of this appointment.  Patient understands that this visit is billable and agree to proceed with appointment.

## 2022-11-23 NOTE — PROGRESS NOTES
Assessment/Plan:    Assessment & Plan   Diagnoses and all orders for this visit:    Calculus of ureter  -     Stone analysis [GHI912]; Future    Hydronephrosis with urinary obstruction due to ureteral calculus    Stone Management Plan  Stone Management 11/23/2022   Urinary Tract Infection No suspicion of infection   Renal Colic Asymptomatic at this time   Renal Failure No suspicion of renal failure   Current CT date 11/19/2022   Right sided stones? No   R Stone Event No current event   Left sided stones? Yes   L Number of ureteral stones 1   L GSD of ureteral stones 2   L Location of ureteral stone Distal   L Number of kidney stones  No renal stones   L Hydronephrosis Mild   L Stone Event New stone passed prior to visit   Diagnosis date 11/19/2022   Initial location of primary symptomatic stone Distal   Initial GSD of primary symptomatic stone 2   Resolved date 11/19/2022         PLAN    46 yo M first time stone former with recent ER visit for obstructing, small left UVJ stone, passed.     Based on review of findings with the patient, he will follow up as needed. Stone prevention measures reviewed with patient. Patient verbalized understanding. Patient agrees with plan as discussed.     Video call duration: 16 minutes  Distant site (provider site): off-site  22 minutes spent on the date of the encounter doing chart review, history and exam, documentation and further activities per the note    Soraida Bueno PA-C  [facility]    Subjective:     HPI  Mr. Rashad Traore is a 47 year old  male who is being evaluated via a billable video visit by [facility] [place] following [facility] ER visit for urolithiasis.    He is a first time unidentified composition stone former. He has no identified modifiable stone risk factors. He has no identified non-modifiable stone risk factors.    He was seen in ER 11/19/22 for acute onset left flank pain, starting an hour  prior to arrival. The pain was constant, sharp and nonradiating. No provoking or palliating factors. No similar pain in past. He had associated urinary urgency. Workup was notable for CT reporting an obstructing left distal ureteral stone. Labs were negative for infection or renal injury. He was sent home with zofran and flomax.    He is doing much better, passed the stone within 2 hours of ER visit. Pertinent negative current symptoms include:  fever, chills, right flank pain, nausea, vomiting, hematuria, urinary frequency and dysuria.     CT scan from 11/19/22 is personally reviewed and demonstrates a mildly obstructing 2 mm left UVJ stone, passed in interval.    Significant labs from presentation include severe hematuria, no pyuria, negative nitrite, normal WBC, normal creatinine and normal potassium.    ROS   A 12 point comprehensive review of systems is negative except for HPI    Past Medical History:   Diagnosis Date     Cholelithiasis 12/18/2020     Kidney stone 11/19/2022       Past Surgical History:   Procedure Laterality Date     LAPAROSCOPIC CHOLECYSTECTOMY N/A 12/18/2020    Procedure: LAPAROSCOPIC CHOLECYSTECTOMY;  Surgeon: Jacob Edwards MD;  Location:  OR       Current Outpatient Medications   Medication Sig Dispense Refill     HYDROcodone-acetaminophen (NORCO) 5-325 MG tablet Take 1-2 tablets by mouth every 4 hours as needed for moderate to severe pain 10 tablet 0     ondansetron (ZOFRAN ODT) 4 MG ODT tab Take 1 tablet (4 mg) by mouth every 8 hours as needed for nausea 10 tablet 0     tamsulosin (FLOMAX) 0.4 MG capsule Take 1 capsule (0.4 mg) by mouth daily for 10 doses 10 capsule 0       No Known Allergies    Social History     Socioeconomic History     Marital status:      Spouse name: Not on file     Number of children: Not on file     Years of education: Not on file     Highest education level: Not on file   Occupational History     Not on file   Tobacco Use     Smoking status:  Never     Smokeless tobacco: Never   Substance and Sexual Activity     Alcohol use: Yes     Comment: 3 drinks per week     Drug use: No     Sexual activity: Yes   Other Topics Concern     Not on file   Social History Narrative     Not on file     Social Determinants of Health     Financial Resource Strain: Not on file   Food Insecurity: Not on file   Transportation Needs: Not on file   Physical Activity: Not on file   Stress: Not on file   Social Connections: Not on file   Intimate Partner Violence: Not on file   Housing Stability: Not on file       Family History   Problem Relation Age of Onset     Cancer Father         leukemia     Diabetes Daughter         type 1 diabetes        Objective:     No vitals or physical exam obtained due to virtual visit    LABS  Most Recent 3 CBC's:  Recent Labs   Lab Test 11/19/22 0417 12/14/20  1616 08/15/20  2323 01/17/19  0023   WBC 6.6  --  16.4* 10.4   HGB 15.6 14.8 15.2 17.3   MCV 92  --  90 88     --  273 284     Most Recent 3 BMP's:  Recent Labs   Lab Test 11/19/22 0417 12/14/20  1616 08/15/20  2323    139 138   POTASSIUM 3.8 4.0 3.4   CHLORIDE 104 108 105   CO2 24 26 26   BUN 20.2* 13 19   CR 1.15 0.83 0.96   ANIONGAP 13 5 7   KARIS 9.4 9.3 9.1   * 92 110*     Most Recent Urinalysis:  Recent Labs   Lab Test 11/19/22  0701   COLOR Yellow   APPEARANCE Clear   URINEGLC Negative   URINEBILI Negative   URINEKETONE Negative   SG 1.034   UBLD Large*   URINEPH 6.0   PROTEIN 30*   NITRITE Negative   LEUKEST Negative   RBCU 53*   WBCU 1

## 2022-11-23 NOTE — PATIENT INSTRUCTIONS
Calcium Oxalate Stone Prevention Self Management    Drink more fluids:    Drinking more liquids is the best way you can help prevent future stones. Stones can form when substances in the urine are too concentrated. The more you drink, the more urine you will make. This means all substances in the urine will be less concentrated.    How much urine should I be producing?    The usual recommended daily urine production is about 2 to 3 quarts (5724-8040 ml). If you are producing more than 3 quarts of urine on a regular basis, it is possible to deplete important minerals stored in the body.    To measure the amount of urine you produce in a day, you can either:  Collect all urine in a container and measure at the end of the day   Use a measuring cup each time you urinate and add up the amounts at the end of the day     Observe  Color - Dark gerda urine is concentrated. Light straw color or lighter is dilute and desirable   Odor - Concentrated urine tends to smell stronger. Dilute urine is nearly odorless    Ways to increase your fluid intake    Increasing the amount of fluid you drink is effective for all types of kidney stones. While water is commonly recommended, all fluids are effective for increasing the amount of urine your body produces.  Focus on starting a lifelong habit, rather than a short-term solution.   Keep liquids on hand that you like. Crystal Light is a low calorie appropriate choice.  Drink out of larger glasses. You'll tend to drink more with each serving.   Have an additional glass of fluid with each meal.   Keep a water or drink bottle at work and fill it regularly.     *If you are prone to fluid retention, consult your doctor before making changes to your fluid habits.    Low Oxalate Diet:    Avoid excess amounts or daily consumption of these foods:  All nuts and nut products including peanuts, almonds, pecans, peanut butter, almond milk  Rhubarb  Chocolate  Soybeans and soy products    Spinach  Wheat Germ  Beets    Maintain a normal calcium diet:    Researches have found that people with low calcium intakes tend to have more stones. Foods with high calcium content are acceptable and include:  Dairy products (including milk, cheese and yogurt)  Meat and fish  Enriched cereals  Dark green vegetables    What about calcium supplements?     Many people take calcium supplements, either on their own or as prescribed by a doctor. Research has indicated that calcium supplements do not usually pose a risk for stone formation.  Calcium citrate is a better choice for a supplement.    Avoid excess salt:    Salt (sodium chloride) is found in abundance in many foods. High sodium levels in the urine can interfere with the kidney's handling of calcium.     Tips for reducing the salt in your diet:  Don't use salt at the table  Reduce the salt used in food preparation. Try 1/2 teaspoon when recipes call for 1 teaspoon.  Use herbs and spices for flavoring instead of salt.  Avoid salty foods.  Check the label before you buy or use a product. Note sodium and portion size information.  Try to consume less than 2,000 mg/day. (1 teaspoon = 2,000 mg)    Foods with high sodium content include:  Processed meat (including luncheon meats, sausage)   Crackers   Instant cereal   Processed cheese   Canned soups   Chips and snack foods   Soy sauce    [facility] can respond to your questions or concerns 24 hours a day at [phone].

## 2023-02-18 ENCOUNTER — HEALTH MAINTENANCE LETTER (OUTPATIENT)
Age: 48
End: 2023-02-18

## 2024-03-16 ENCOUNTER — HEALTH MAINTENANCE LETTER (OUTPATIENT)
Age: 49
End: 2024-03-16

## 2024-06-12 NOTE — DISCHARGE INSTRUCTIONS
HOME CARE FOLLOWING LAPAROSCOPIC CHOLECYSTECTOMY  SANDOVAL Penaloza, LUISA Daley R. O Donnell, J. Shaheen    INCISIONAL CARE:  Replace the bandage over your incisions DAILY until all drainage stops, or if more comfortable to have in place.  If present, leave the steri-strips (white paper tapes) in place for 14 days after surgery.  If Dermabond (a type of skin glue) is present, leave in place until it wears/flakes off (2-3 weeks).     BATHING:  OK to shower 48 hours after surgery.  Avoid baths for 1 week after surgery.  You may wash your hair at any time.  Gently pat your incision dry after bathing.  Do not apply lotions, creams, or ointments to incisions.    ACTIVITY:  Light Activity -- you may immediately be up and about as tolerated.  Walking is encouraged, increase as tolerated.  Driving/Light Work-- when comfortable and off narcotic pain medications.  Strenuous Work/Activity -- limit lifting to 20 pounds for 2 weeks.  Progressively increase with time.  Active Sports (running, biking, etc.) -- cautiously resume after 2 weeks.    DISCOMFORT:  Local anesthetic placed at surgery should provide relief for 4-8 hours.  Begin taking pain pills before discomfort is severe.  Take the pain medication with some food, when possible, to minimize side effects.  Intermittent use of ice packs may help during the first 1-3 weeks after surgery.  Expect gradual improvement.    Over-the-counter anti-inflammatory medications (i.e. Ibuprofen/Advil/Motrin or Naprosyn/Aleve) may be used per package instructions in addition to or while tapering off the narcotic pain medications to decrease swelling and sensitivity.  DO NOT TAKE these Anti-inflammatory medications if your primary physician has advised against doing so, or if you have acid reflux, ulcer, or bleeding disorder, or take blood-thinner medications.  Call your primary physician or the surgery office if you have medication questions.    After laparoscopic  cholecystectomy, you may have shoulder or upper back discomfort due to the gas used during surgery.  This is temporary and should resolve within 2-3 days.  Frequent short walks may help with this.  You may have decreased energy level for 1-2 weeks after surgery related to your recovery.    DIET:  Start with liquids and gradually increase diet as tolerated.  Drink plenty of fluids.  While taking pain medications, consider use of a stool softener, increase your fiber in your diet, or add a fiber supplement (like Metamucil, Citrucel) to help prevent constipation - a possible side effect of pain medications.  It is not uncommon to experience some bowel changes (loose stools or constipation) after surgery.  Your body has to adapt to you no longer having a gall bladder.  To help minimize this side effect, avoid fatty foods for 1-2 weeks after surgery.  You may then slowly increase the amount of fatty foods in your diet.      NAUSEA:  If nauseated from the anesthetic/pain meds; rest in bed, get up cautiously with assistance, and drink clear liquids (juice, tea, broth).    FOLLOW-UP AFTER SURGERY:  -Our office will contact you approximately 2-3 weeks after surgery to check on your progress and answer any questions you may have.  If you are doing well, you will not need to return for an office appointment.  If any concerns are identified over the phone, we will help you make an appointment to see a provider.    -If you have not received a phone call, have any questions or concerns, or would like to be seen, please call us at [phone].  We are located at: [address], [address]; [place] [address]    -CONTACT US IF THE FOLLOWING DEVELOPS:   1. A fever that is above 101     2. Increased redness, warmth, drainage, bleeding, or swelling.   3. Pain that is not relieved by rest/ice and your prescription.   4.  Increasing pain after 48 hours.   5. Drainage that is thick, cloudy, yellow, green or white.   6. Any other  questions or concerns.      FREQUENTLY ASKED QUESTIONS:    Q:  How should my incision look?    A:  Normally your incision will appear slightly swollen with light redness directly along the incision itself as it heals.  It may feel like a bump or ridge as the healing/scarring happens, and over time (3-4 months) this bump or ridge feeling should slowly go away.  In general, clear or pink watery drainage can be normal at first as your incision heals, but should decrease over time.    Q:  How do I know if my incision is infected?  A:  Look at your incision for signs of infection, like redness around the incision spreading to surrounding skin, or drainage of cloudy or foul-smelling drainage.  If you feel warm, check your temperature to see if you are running a fever.    **If any of these things occur, please notify the nurse at our office.  We may need you to come into the office for an incision check.      Q:  How do I take care of my incision?  A:  If you have a dressing in place - Starting the day after surgery, replace the dressing 1-2 times a day until there is no further drainage from the incision.  At that time, a dressing is no longer needed.  Try to minimize tape on the skin if irritation is occurring at the tape sites.  If you have significant irritation from tape on the skin, please call the office to discuss other method of dressing your incision.    Small pieces of tape called  steri-strips  may be present directly overlying your incision; these may be removed 10 days after surgery unless otherwise specified by your surgeon.  If these tapes start to loosen at the ends, you may trim them back until they fall off or are removed.    A:  If you had  Dermabond  tissue glue used as a dressing (this causes your incision to look shiny with a clear covering over it) - This type of dressing wears off with time and does not require more dressings over the top unless it is draining around the glue as it wears off.  Do  not apply ointments or lotions over the incisions until the glue has completely worn off.    Q:  There is a piece of tape or a sticky  lead  still on my skin.  Can I remove this?  A:  Sometimes the sticky  leads  used for monitoring during surgery or for evaluation in the emergency department are not all removed while you are in the hospital.  These sometimes have a tab or metal dot on them.  You can easily remove these on your own, like taking off a band-aid.  If there is a gel substance under the  lead , simply wipe/clean it off with a washcloth or paper towel.      Q:  What can I do to minimize constipation (very hard stools, or lack of stools)?  A:  Stay well hydrated.  Increase your dietary fiber intake or take a fiber supplement -with plenty of water.  Walk around frequently.  You may consider an over-the-counter stool-softener.  Your Pharmacist can assist you with choosing one that is stocked at your pharmacy.  Constipation is also one of the most common side effects of pain medication.  If you are using pain medication, be pro-active and try to PREVENT problems with constipation by taking the steps above BEFORE constipation becomes a problem.    Q:  What do I do if I need more pain medications?  A:  Call the office to receive refills.  Be aware that certain pain meds cannot be called into a pharmacy and actually require a paper prescription.  A change may be made in your pain med as you progress thru your recovery period or if you have side effects to certain meds.    --Pain meds are NOT refilled after 5pm on weekdays, and NOT AT ALL on the weekends, so please look ahead to prevent problems.      Q:  Why am I having a hard time sleeping now that I am at home?  A:  Many medications you receive while you are in the hospital can impact your sleep for a number of days after your surgery/hospitalization.  Decreased level of activity and naps during the day may also make sleeping at night difficult.  Try to  minimize day-time naps, and get up frequently during the day to walk around your home during your recovery time.  Sleep aides may be of some help, but are not recommended for long-term use.      Q:  I am having some back discomfort.  What should I do?  A:  This may be related to certain positioning that was required for your surgery, extended periods of time in bed, or other changes in your overall activity level.  You may try ice, heat, acetaminophen, or ibuprofen to treat this temporarily.  Note that many pain medications have acetaminophen in them and would state this on the prescription bottle.  Be sure not to exceed the maximum of 4000mg per day of acetaminophen.     **If the pain you are having does not resolve, is severe, or is a flare of back pain you have had on other occasions prior to surgery, please contact your primary physician for further recommendations or for an appointment to be examined at their office.    Q:  Why am I having headaches?  A:  Headaches can be caused by many things:  caffeine withdrawal, use of pain meds, dehydration, high blood pressure, lack of sleep, over-activity/exhaustion, flare-up of usual migraine headaches.  If you feel this is related to muscle tension (a band-like feeling around the head, or a pressure at the low-back of the head) you may try ice or heat to this area.  You may need to drink more fluids (try electrolyte drink like Gatorade), rest, or take your usual migraine medications.   **If your headaches do not resolve, worsen, are accompanied by other symptoms, or if your blood pressure is high, please call your primary physician for recommendation and/or examination.    Q:  I am unable to urinate.  What do I do?  A:  A small percentage of people can have difficulty urinating initially after surgery.  This includes being able to urinate only a very small amount at a time and feeling discomfort or pressure in the very low abdomen.  This is called  urinary retention ,  and is actually an urgent situation.  Proceed to your nearest Emergency department for evaluation (not an Urgent Care Center).  Sometimes the bladder does not work correctly after certain medications you receive during surgery, or related to certain procedures.  You may need to have a catheter placed until your bladder recovers.  When planning to go to an Emergency department, it may help to call the ER to let them know you are coming in for this problem after a surgery.  This may help you get in quicker to be evaluated.  **If you have symptoms of a urinary tract infection, please contact your primary physician for the proper evaluation and treatment.          If you have other questions, please call the office Monday thru Friday between 8am and 5pm to discuss with the nurse or physician assistant.  #[phone]    There is a surgeon ON CALL on weekday evenings and over the weekend in case of urgent need only, and may be contacted at the same number.    If you are having an emergency, call 911 or proceed to your nearest emergency department.    GENERAL ANESTHESIA OR SEDATION ADULT DISCHARGE INSTRUCTIONS   SPECIAL PRECAUTIONS FOR 24 HOURS AFTER SURGERY    IT IS NOT UNUSUAL TO FEEL LIGHT-HEADED OR FAINT, UP TO 24 HOURS AFTER SURGERY OR WHILE TAKING PAIN MEDICATION.  IF YOU HAVE THESE SYMPTOMS; SIT FOR A FEW MINUTES BEFORE STANDING AND HAVE SOMEONE ASSIST YOU WHEN YOU GET UP TO WALK OR USE THE BATHROOM.    YOU SHOULD REST AND RELAX FOR THE NEXT 24 HOURS AND YOU MUST MAKE ARRANGEMENTS TO HAVE SOMEONE STAY WITH YOU FOR AT LEAST 24 HOURS AFTER YOUR DISCHARGE.  AVOID HAZARDOUS AND STRENUOUS ACTIVITIES.  DO NOT MAKE IMPORTANT DECISIONS FOR 24 HOURS.    DO NOT DRIVE ANY VEHICLE OR OPERATE MECHANICAL EQUIPMENT FOR 24 HOURS FOLLOWING THE END OF YOUR SURGERY.  EVEN THOUGH YOU MAY FEEL NORMAL, YOUR REACTIONS MAY BE AFFECTED BY THE MEDICATION YOU HAVE RECEIVED.    DO NOT DRINK ALCOHOLIC BEVERAGES FOR 24 HOURS FOLLOWING YOUR  SURGERY.    DRINK CLEAR LIQUIDS (APPLE JUICE, GINGER ALE, 7-UP, BROTH, ETC.).  PROGRESS TO YOUR REGULAR DIET AS YOU FEEL ABLE.    YOU MAY HAVE A DRY MOUTH, A SORE THROAT, MUSCLES ACHES OR TROUBLE SLEEPING.  THESE SHOULD GO AWAY AFTER 24 HOURS.    CALL YOUR DOCTOR FOR ANY OF THE FOLLOWING:  SIGNS OF INFECTION (FEVER, GROWING TENDERNESS AT THE SURGERY SITE, A LARGE AMOUNT OF DRAINAGE OR BLEEDING, SEVERE PAIN, FOUL-SMELLING DRAINAGE, REDNESS OR SWELLING.    IT HAS BEEN OVER 8 TO 10 HOURS SINCE SURGERY AND YOU ARE STILL NOT ABLE TO URINATE (PASS WATER).      Neuro

## 2025-03-22 ENCOUNTER — HEALTH MAINTENANCE LETTER (OUTPATIENT)
Age: 50
End: 2025-03-22

## (undated) DEVICE — BAG CLEAR TRASH 1.3M 39X33" P4040C

## (undated) DEVICE — LINEN HALF SHEET 5512

## (undated) DEVICE — ENDO SPONGE ENDOSTICK KITTNER 5MM CHERRY 37002010

## (undated) DEVICE — BLADE KNIFE SURG 11 371111

## (undated) DEVICE — Device

## (undated) DEVICE — ENDO TROCAR SLEEVE KII Z-THREADED 05X100MM CTS02

## (undated) DEVICE — ESU GROUND PAD ADULT W/CORD E7507

## (undated) DEVICE — ESU CORD MONOPOLAR 10'  E0510

## (undated) DEVICE — SUCTION MANIFOLD NEPTUNE 2 SYS 4 PORT 0702-020-000

## (undated) DEVICE — SU VICRYL 0 UR-6 27" J603H

## (undated) DEVICE — SUCTION IRR STRYKERFLOW II W/TIP 250-070-520

## (undated) DEVICE — ENDO TROCAR BLUNT TIP KII BALLOON 12X100MM C0R47

## (undated) DEVICE — GLOVE PROTEXIS POWDER FREE 7.5 ORTHOPEDIC 2D73ET75

## (undated) DEVICE — ENDO TROCAR FIRST ENTRY KII FIOS ADV FIX 05X100MM CFF03

## (undated) DEVICE — SOL NACL 0.9% IRRIG 3000ML BAG 2B7477

## (undated) DEVICE — CLIP APPLIER ENDO 5MM M/L LIGAMAX EL5ML

## (undated) DEVICE — LINEN POUCH DBL 5427

## (undated) DEVICE — SU VICRYL 4-0 PS-2 18" UND J496H

## (undated) DEVICE — ADH SKIN CLOSURE PREMIERPRO EXOFIN MICOR HV 0.5ML 3471

## (undated) DEVICE — LINEN TOWEL PACK X10 5473

## (undated) DEVICE — ENDO POUCH UNIV RETRIEVAL SYSTEM INZII 10MM CD001

## (undated) DEVICE — LINEN FULL SHEET 5511

## (undated) DEVICE — BLADE CLIPPER 3M 9670

## (undated) DEVICE — SOL NACL 0.9% IRRIG 1000ML BOTTLE 2F7124

## (undated) DEVICE — GLOVE PROTEXIS BLUE W/NEU-THERA 8.0  2D73EB80

## (undated) RX ORDER — CELECOXIB 200 MG/1
CAPSULE ORAL
Status: DISPENSED
Start: 2020-12-18

## (undated) RX ORDER — CEFAZOLIN SODIUM 2 G/100ML
INJECTION, SOLUTION INTRAVENOUS
Status: DISPENSED
Start: 2020-12-18

## (undated) RX ORDER — DEXAMETHASONE SODIUM PHOSPHATE 4 MG/ML
INJECTION, SOLUTION INTRA-ARTICULAR; INTRALESIONAL; INTRAMUSCULAR; INTRAVENOUS; SOFT TISSUE
Status: DISPENSED
Start: 2020-12-18

## (undated) RX ORDER — ACETAMINOPHEN 325 MG/1
TABLET ORAL
Status: DISPENSED
Start: 2020-12-18

## (undated) RX ORDER — FENTANYL CITRATE 50 UG/ML
INJECTION, SOLUTION INTRAMUSCULAR; INTRAVENOUS
Status: DISPENSED
Start: 2020-12-18

## (undated) RX ORDER — LIDOCAINE HYDROCHLORIDE 10 MG/ML
INJECTION, SOLUTION EPIDURAL; INFILTRATION; INTRACAUDAL; PERINEURAL
Status: DISPENSED
Start: 2020-12-18

## (undated) RX ORDER — PROPOFOL 10 MG/ML
INJECTION, EMULSION INTRAVENOUS
Status: DISPENSED
Start: 2020-12-18

## (undated) RX ORDER — BUPIVACAINE HYDROCHLORIDE 5 MG/ML
INJECTION, SOLUTION EPIDURAL; INTRACAUDAL
Status: DISPENSED
Start: 2020-12-18

## (undated) RX ORDER — ONDANSETRON 2 MG/ML
INJECTION INTRAMUSCULAR; INTRAVENOUS
Status: DISPENSED
Start: 2020-12-18

## (undated) RX ORDER — GLYCOPYRROLATE 0.2 MG/ML
INJECTION INTRAMUSCULAR; INTRAVENOUS
Status: DISPENSED
Start: 2020-12-18